# Patient Record
Sex: FEMALE | Race: WHITE | NOT HISPANIC OR LATINO | Employment: FULL TIME | URBAN - METROPOLITAN AREA
[De-identification: names, ages, dates, MRNs, and addresses within clinical notes are randomized per-mention and may not be internally consistent; named-entity substitution may affect disease eponyms.]

---

## 2021-02-05 ENCOUNTER — TELEPHONE (OUTPATIENT)
Dept: PLASTIC SURGERY | Facility: CLINIC | Age: 31
End: 2021-02-05

## 2021-02-08 NOTE — TELEPHONE ENCOUNTER
Called pt regarding appt request. No answer, LVM with [facility] contact information.     Janelle Rhoades

## 2021-02-19 ENCOUNTER — TELEPHONE (OUTPATIENT)
Dept: PLASTIC SURGERY | Facility: CLINIC | Age: 31
End: 2021-02-19

## 2021-02-19 DIAGNOSIS — F64.0 GENDER DYSPHORIA IN ADOLESCENT AND ADULT: Primary | ICD-10-CM

## 2021-02-19 NOTE — TELEPHONE ENCOUNTER
[facility] :  Care Coordination Note     SITUATION   Pt (Max, they/he) is a 30 year old female who is receiving support for:  Care Team  .    BACKGROUND     Pt is interested in top surgery with Dr. Owens. Pt previously had a breast reduction with Dr Coats at [facility] in [place] 15 years ago. Scheduled consultation with Dr. Owens 9/28/21.     Pt does not have insurance at the moment, but will have it soon. Pt to call with insurance information as soon as they get it.     ASSESSMENT     Surgery              [facility] Assessment  [facility] ([facility]) Enrollment: (P) Enrolled  Patient has a therapist: (P) Yes  Name of therapist: (P) Talha Ling at [facility] in [place]  Letter of support #1: (P) Requested  Surgery being considered: (P) Yes  Mastectomy: (P) Yes    Pt reports:    No smoking    No diabetes    Intermittent HRT use for 2 years, currently restarted 1 month ago    Previously underwent breast reduction with Dr Coats at [facility] in [place] 15 years ago.    Sees Talha Ling at [facility] in [place]      PLAN          Nursing Interventions:  [facility] assessment completed    Follow-up plan:    1. Writer to request records if possible    2. Obtain BRANDON Rhoades

## 2021-04-14 NOTE — TELEPHONE ENCOUNTER
FUTURE VISIT INFORMATION      FUTURE VISIT INFORMATION:    Date: 9.28.21    Time: 11 Am    Location: [facility]  REFERRAL INFORMATION:    Referring provider:  NA    Referring providers clinic: NA      Reason for visit/diagnosis  new top    RECORDS REQUESTED FROM:       Clinic name Comments Records Status Imaging Status   [facility] Dr. Gissell Coats,  breast reduction surgery in 2006 In process                                      Action    Action Taken Sent fax to [facility] at fax number [phone]

## 2021-05-01 ENCOUNTER — HEALTH MAINTENANCE LETTER (OUTPATIENT)
Age: 31
End: 2021-05-01

## 2021-07-01 NOTE — TELEPHONE ENCOUNTER
FUTURE VISIT INFORMATION      FUTURE VISIT INFORMATION:    Date: 9/28/21    Time:11:00am    Location:[facility]  REFERRAL INFORMATION:    Referring provider:  self    Referring providers clinic:  N/A    Reason for visit/diagnosis  top consult    RECORDS REQUESTED FROM:       Clinic name Comments Records Status Imaging Status   [facility] Request for recs sent 7/1- resent 8/4- Per clinic unable to locate any records under this name- checking about any prior names- LM for patient x2 checking for prev names

## 2021-09-27 ENCOUNTER — TELEPHONE (OUTPATIENT)
Dept: SURGERY | Facility: CLINIC | Age: 31
End: 2021-09-27

## 2021-09-27 ENCOUNTER — TELEPHONE (OUTPATIENT)
Dept: PLASTIC SURGERY | Facility: CLINIC | Age: 31
End: 2021-09-27

## 2021-09-27 NOTE — TELEPHONE ENCOUNTER
YEISON for patient confirming appointment with  tomorrow 9/28 at 11:00 in person visit.    Call back number was provided.

## 2021-09-27 NOTE — TELEPHONE ENCOUNTER
[facility] Call Center    Phone Message    May a detailed message be left on voicemail: yes     Reason for Call: Other: Max is calling in asking to have their appointment on 9/28 canceled. Patient states that they have a scheduling conflict and will be uunable to attend the appointment, and will call back at a later date to reschedule.     Action Taken: Message routed to:  [facility]    Travel Screening: Not Applicable

## 2021-09-28 ENCOUNTER — PRE VISIT (OUTPATIENT)
Dept: SURGERY | Facility: CLINIC | Age: 31
End: 2021-09-28

## 2021-09-28 ENCOUNTER — PRE VISIT (OUTPATIENT)
Dept: PLASTIC SURGERY | Facility: CLINIC | Age: 31
End: 2021-09-28

## 2021-10-11 ENCOUNTER — HEALTH MAINTENANCE LETTER (OUTPATIENT)
Age: 31
End: 2021-10-11

## 2022-05-22 ENCOUNTER — HEALTH MAINTENANCE LETTER (OUTPATIENT)
Age: 32
End: 2022-05-22

## 2022-09-25 ENCOUNTER — HEALTH MAINTENANCE LETTER (OUTPATIENT)
Age: 32
End: 2022-09-25

## 2023-06-04 ENCOUNTER — HEALTH MAINTENANCE LETTER (OUTPATIENT)
Age: 33
End: 2023-06-04

## 2025-04-28 ENCOUNTER — TELEPHONE (OUTPATIENT)
Dept: BEHAVIORAL HEALTH | Facility: CLINIC | Age: 35
End: 2025-04-28

## 2025-04-28 ENCOUNTER — HOSPITAL ENCOUNTER (INPATIENT)
Facility: CLINIC | Age: 35
DRG: 885 | End: 2025-04-28
Attending: FAMILY MEDICINE | Admitting: PSYCHIATRY & NEUROLOGY
Payer: COMMERCIAL

## 2025-04-28 DIAGNOSIS — F43.10 PTSD (POST-TRAUMATIC STRESS DISORDER): ICD-10-CM

## 2025-04-28 DIAGNOSIS — R45.851 SUICIDAL IDEATION: ICD-10-CM

## 2025-04-28 DIAGNOSIS — F33.1 MAJOR DEPRESSIVE DISORDER, RECURRENT EPISODE, MODERATE (H): ICD-10-CM

## 2025-04-28 DIAGNOSIS — F41.1 GAD (GENERALIZED ANXIETY DISORDER): ICD-10-CM

## 2025-04-28 PROBLEM — F64.0 GENDER DYSPHORIA IN ADULT: Chronic | Status: ACTIVE | Noted: 2025-04-28

## 2025-04-28 LAB
AMPHETAMINES UR QL SCN: ABNORMAL
BARBITURATES UR QL SCN: ABNORMAL
BENZODIAZ UR QL SCN: ABNORMAL
BZE UR QL SCN: ABNORMAL
CANNABINOIDS UR QL SCN: ABNORMAL
FENTANYL UR QL: ABNORMAL
OPIATES UR QL SCN: ABNORMAL
PCP QUAL URINE (ROCHE): ABNORMAL

## 2025-04-28 PROCEDURE — 99285 EMERGENCY DEPT VISIT HI MDM: CPT | Performed by: FAMILY MEDICINE

## 2025-04-28 PROCEDURE — 250N000013 HC RX MED GY IP 250 OP 250 PS 637: Performed by: EMERGENCY MEDICINE

## 2025-04-28 PROCEDURE — 80307 DRUG TEST PRSMV CHEM ANLYZR: CPT | Performed by: FAMILY MEDICINE

## 2025-04-28 PROCEDURE — 250N000013 HC RX MED GY IP 250 OP 250 PS 637: Performed by: FAMILY MEDICINE

## 2025-04-28 RX ORDER — TESTOSTERONE 1.62 MG/G
3 GEL TRANSDERMAL DAILY
Status: ON HOLD | COMMUNITY
End: 2025-05-02

## 2025-04-28 RX ORDER — ONDANSETRON 4 MG/1
4 TABLET, ORALLY DISINTEGRATING ORAL EVERY 8 HOURS PRN
Status: ON HOLD | COMMUNITY
End: 2025-05-02

## 2025-04-28 RX ORDER — ACETAMINOPHEN 325 MG/1
650 TABLET ORAL EVERY 6 HOURS PRN
Status: DISCONTINUED | OUTPATIENT
Start: 2025-04-28 | End: 2025-04-29

## 2025-04-28 RX ORDER — HYDROXYZINE PAMOATE 50 MG/1
50-100 CAPSULE ORAL
Status: ON HOLD | COMMUNITY
End: 2025-05-02

## 2025-04-28 RX ORDER — HYDROXYZINE HYDROCHLORIDE 25 MG/1
25 TABLET, FILM COATED ORAL EVERY 6 HOURS PRN
Status: DISCONTINUED | OUTPATIENT
Start: 2025-04-28 | End: 2025-04-29

## 2025-04-28 RX ORDER — BUSPIRONE HYDROCHLORIDE 10 MG/1
20 TABLET ORAL 2 TIMES DAILY
COMMUNITY

## 2025-04-28 RX ORDER — HYDROXYZINE HYDROCHLORIDE 50 MG/1
50 TABLET, FILM COATED ORAL EVERY 6 HOURS PRN
Status: DISCONTINUED | OUTPATIENT
Start: 2025-04-28 | End: 2025-04-29

## 2025-04-28 RX ORDER — SUMATRIPTAN 50 MG/1
50 TABLET, FILM COATED ORAL
Status: ON HOLD | COMMUNITY
End: 2025-05-02

## 2025-04-28 RX ORDER — ACETAMINOPHEN 500 MG
500 TABLET ORAL ONCE
Status: COMPLETED | OUTPATIENT
Start: 2025-04-28 | End: 2025-04-28

## 2025-04-28 RX ORDER — OXYCODONE AND ACETAMINOPHEN 5; 325 MG/1; MG/1
1 TABLET ORAL ONCE
Status: COMPLETED | OUTPATIENT
Start: 2025-04-28 | End: 2025-04-28

## 2025-04-28 RX ORDER — HYDROXYZINE HYDROCHLORIDE 25 MG/1
25 TABLET, FILM COATED ORAL ONCE
Status: COMPLETED | OUTPATIENT
Start: 2025-04-28 | End: 2025-04-28

## 2025-04-28 RX ORDER — MAGNESIUM HYDROXIDE/ALUMINUM HYDROXICE/SIMETHICONE 120; 1200; 1200 MG/30ML; MG/30ML; MG/30ML
30 SUSPENSION ORAL EVERY 6 HOURS PRN
Status: DISCONTINUED | OUTPATIENT
Start: 2025-04-28 | End: 2025-04-29

## 2025-04-28 RX ORDER — DULOXETIN HYDROCHLORIDE 60 MG/1
60 CAPSULE, DELAYED RELEASE ORAL DAILY
COMMUNITY

## 2025-04-28 RX ADMIN — ACETAMINOPHEN 500 MG: 500 TABLET ORAL at 15:11

## 2025-04-28 RX ADMIN — ALUMINUM HYDROXIDE, MAGNESIUM HYDROXIDE, AND SIMETHICONE 30 ML: 200; 200; 20 SUSPENSION ORAL at 21:18

## 2025-04-28 RX ADMIN — HYDROXYZINE HYDROCHLORIDE 25 MG: 25 TABLET, FILM COATED ORAL at 15:10

## 2025-04-28 RX ADMIN — ACETAMINOPHEN 650 MG: 325 TABLET ORAL at 20:50

## 2025-04-28 ASSESSMENT — ACTIVITIES OF DAILY LIVING (ADL)
ADLS_ACUITY_SCORE: 41

## 2025-04-28 ASSESSMENT — COLUMBIA-SUICIDE SEVERITY RATING SCALE - C-SSRS
1. IN THE PAST MONTH, HAVE YOU WISHED YOU WERE DEAD OR WISHED YOU COULD GO TO SLEEP AND NOT WAKE UP?: YES
3. HAVE YOU BEEN THINKING ABOUT HOW YOU MIGHT KILL YOURSELF?: YES
2. HAVE YOU ACTUALLY HAD ANY THOUGHTS OF KILLING YOURSELF IN THE PAST MONTH?: YES
6. HAVE YOU EVER DONE ANYTHING, STARTED TO DO ANYTHING, OR PREPARED TO DO ANYTHING TO END YOUR LIFE?: YES
5. HAVE YOU STARTED TO WORK OUT OR WORKED OUT THE DETAILS OF HOW TO KILL YOURSELF? DO YOU INTEND TO CARRY OUT THIS PLAN?: NO
4. HAVE YOU HAD THESE THOUGHTS AND HAD SOME INTENTION OF ACTING ON THEM?: YES
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
## 2025-04-28 NOTE — ED PROVIDER NOTES
"    [facility] EMERGENCY DEPARTMENT ([facility])    4/28/25      ED PROVIDER NOTE   History     Chief Complaint   Patient presents with    Suicidal     The history is provided by the patient and medical records.     Jason Alfaro is a 34 year old female with a history of EVELYNE, MDD, and PTSD who presents to the ED for Mental Health Evaluation.  Patient notes that they have been dealing with issues for several years.  Has never been admitted for mental health issues.  Patient now has had some increasing stressors etc. with increasing anxiety typically takes hydroxyzine for this.  Patient notes increasing COVID suicidal ideations is thought about taking the bedsheet and strangling herself here in the ER also.  Denies any major drug use and just recently stopped her marijuana use has been ongoing for years but feels is not a precipitating agent.  Denies hallucinations otherwise.  No other ingestions reported this point now presents here for evaluation    Past Medical History  No past medical history on file.  No past surgical history on file.  No current outpatient medications on file.    No Known Allergies  Family History  No family history on file.  Social History   Social History     Tobacco Use    Smoking status: Former     Types: Cigarettes    Smokeless tobacco: Never   Substance Use Topics    Alcohol use: Not Currently      A medically appropriate review of systems was performed with pertinent positives and negatives noted in the HPI, and all other systems negative.    Physical Exam   BP: 103/72  Pulse: 97  Temp: 98.3  F (36.8  C)  Resp: 18  Height: 160 cm (5' 3\")  Weight: 61.2 kg (135 lb)  SpO2: 98 %  Physical Exam  Vitals and nursing note reviewed.   Constitutional:       General: She is in acute distress.      Appearance: Normal appearance. She is well-developed. She is not toxic-appearing.      Comments: Patient is interactive and cooperative with good eye contact.  Not combative does not appear to be " impaired .  Does admit to having the suicidal thoughts.   HENT:      Head: Normocephalic and atraumatic.      Mouth/Throat:      Mouth: Mucous membranes are moist.      Pharynx: Oropharynx is clear.   Eyes:      General: No scleral icterus.     Extraocular Movements: Extraocular movements intact.      Conjunctiva/sclera: Conjunctivae normal.      Pupils: Pupils are equal, round, and reactive to light.   Neck:      Comments: Some mild neck pain without meningismus  Cardiovascular:      Rate and Rhythm: Normal rate and regular rhythm.   Pulmonary:      Effort: Pulmonary effort is normal. No respiratory distress.      Breath sounds: No stridor.   Abdominal:      General: Abdomen is flat.      Tenderness: There is no guarding.   Musculoskeletal:         General: No signs of injury.      Cervical back: Normal range of motion and neck supple. Tenderness present. No rigidity.   Skin:     General: Skin is warm and dry.      Capillary Refill: Capillary refill takes less than 2 seconds.      Coloration: Skin is not jaundiced or pale.      Findings: No rash.   Neurological:      General: No focal deficit present.      Mental Status: She is alert and oriented to person, place, and time. Mental status is at baseline.   Psychiatric:      Comments: Mildly flattened affect with suicidal ideations at this point.  Does have some impulsive thoughts also.  He is not delusional not homicidal etc.  Cooperative here in the ER           ED Course, Procedures, & Data      Records reviewed in epic as noted.  Patient history of some cyclical vomiting in the past  Meds reviewed allergies reviewed.  Patient history of a stress disorder PTSD general anxiety disorder.    ER did order drug screen along with this will order a DEC assessment also.  Screen positive for THC primarily.    DEC evaluation recommended admission patient is voluntary does agree at this point.  I did write for some orders planning for admission to mental health.  The patient  wants to leave would reassess at this point.  Otherwise been cooperative did receive Tylenol for some chronic neck pain and back pain.              Procedures                Results for orders placed or performed during the hospital encounter of 04/28/25   Urine Drug Screen Panel     Status: Abnormal   Result Value Ref Range    Amphetamines Urine Screen Negative Screen Negative    Barbituates Urine Screen Negative Screen Negative    Benzodiazepine Urine Screen Negative Screen Negative    Cannabinoids Urine Screen Positive (A) Screen Negative    Cocaine Urine Screen Negative Screen Negative    Fentanyl Qual Urine Screen Negative Screen Negative    Opiates Urine Screen Negative Screen Negative    PCP Urine Screen Negative Screen Negative   Urine Drug Screen     Status: Abnormal    Narrative    The following orders were created for panel order Urine Drug Screen.  Procedure                               Abnormality         Status                     ---------                               -----------         ------                     Urine Drug Screen Panel[[id number]]     Abnormal            Final result                 Please view results for these tests on the individual orders.     Medications   hydrOXYzine HCl (ATARAX) tablet 25 mg (has no administration in time range)     Or   hydrOXYzine HCl (ATARAX) tablet 50 mg (has no administration in time range)   hydrOXYzine HCl (ATARAX) tablet 25 mg (25 mg Oral $Given 4/28/25 1510)   acetaminophen (TYLENOL) tablet 500 mg (500 mg Oral $Given 4/28/25 1511)   oxyCODONE-acetaminophen (PERCOCET) 5-325 MG per tablet 1 tablet (1 tablet Oral Not Given 4/28/25 1654)     Labs Ordered and Resulted from Time of ED Arrival to Time of ED Departure   URINE DRUG SCREEN PANEL - Abnormal       Result Value    Amphetamines Urine Screen Negative      Barbituates Urine Screen Negative      Benzodiazepine Urine Screen Negative      Cannabinoids Urine Screen Positive (*)     Cocaine Urine  Screen Negative      Fentanyl Qual Urine Screen Negative      Opiates Urine Screen Negative      PCP Urine Screen Negative       No orders to display          Critical care was not performed.     Medical Decision Making  The patient's presentation was of high complexity (an acute health issue posing potential threat to life or bodily function).    The patient's evaluation involved:  review of external note(s) from 3+ sources (see separate area of note for details)  review of 3+ test result(s) ordered prior to this encounter (see separate area of note for details)  ordering and/or review of 3+ test(s) in this encounter (see separate area of note for details)  discussion of management or test interpretation with another health professional (see separate area of note for details)    The patient's management necessitated high risk (a decision regarding hospitalization).    Assessment & Plan   34-year-old patient with history of general anxiety disorder along with that major depressive disorder PTSD presents now with increasing thoughts suicidal ideations impulsive activity etc.  History of self injures behavior but not currently.  Patient is thoughts of taking a sheet and strangle himself at times.  This point of cooperative patient typically takes hydroxyzine for symptoms.  Here in the ER did receive 25 mg hydroxyzine drug screen just shows positive THC which patient states they have given up as of last weekend.  Felt this is not a precipitating event now presents here for seeking help will be seen by DEC assessment.  DEC evaluation recommends admission mental health patient voluntary agrees with plan.  Patient given Tylenol for some reported neck and lower back pain.  Otherwise patient currently is resting here voluntary will admit to mental health for ongoing evaluation suicidal risk if patient wants to leave needs to be reassessed.  Signed out to evening physician       I have reviewed the nursing notes. I have  reviewed the findings, diagnosis, plan and need for follow up with the patient.    New Prescriptions    No medications on file       Final diagnoses:   Suicidal ideation   PTSD (post-traumatic stress disorder)   Major depressive disorder, recurrent episode, moderate (H)   EVELYNE (generalized anxiety disorder)       Chester Paulino MD  [facility] EMERGENCY DEPARTMENT  4/28/2025    This note was created at least in part by the use of dragon voice dictation system. Inadvertent typographical errors may still exist.  Chester Paulino MD.  Patient evaluated in the emergency department during the COVID-19 pandemic period. Careful attention to patients safety was addressed throughout the evaluation. Evaluation and treatment management was initiated with disposition made efficiently and appropriate as possible to minimize any risk of potential exposure to patient during this evaluation.       Chester Paulino MD  04/28/25 1943

## 2025-04-28 NOTE — PROGRESS NOTES
IP MH Referral Acuity Rating Score (RARS)    LMHP complete at referral to IP MH, with DEC; and, daily while awaiting IP MH placement. Call score to PPS.  CRITERIA SCORING   New 71 HH and Involuntary for IP MH (not adolescent) 0/3   Boarding over 24 hours 0/1   Vulnerable adult at least 55+ with multiple co morbidities; or, Patient age 11 or under 0/1   Suicide ideation without relief of precipitating factors 1/1   Current plan for suicide 1/1   Current plan for homicide 0/1   Imminent risk or actual attempt to seriously harm another without relief of factors precipitating the attempt 0/1   Severe dysfunction in daily living (ex: complete neglect for self care, extreme disruption in vegetative function, extreme deterioration in social interactions) 1/1   Recent (last 2 weeks) or current physical aggression in the ED 0/1   Restraints or seclusion episode in ED 0/1   Verbal aggression, agitation, yelling, etc., while in the ED 0/1   Active psychosis with psychomotor agitation or catatonia 0/1   Need for constant or near constant redirection (from leaving, from others, etc).  0/1   Intrusive or disruptive behaviors 1/1   TOTAL 4       LIZZY Santiago, LGJANUARY  Psychotherapist Trainee, DEC   [facility]  [email or web address]
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
## 2025-04-28 NOTE — ED TRIAGE NOTES
Triage Assessment (Adult)       Row Name 04/28/25 1343          Triage Assessment    Airway WDL WDL        Respiratory WDL    Respiratory WDL WDL        Skin Circulation/Temperature WDL    Skin Circulation/Temperature WDL WDL        Cardiac WDL    Cardiac WDL WDL        Cognitive/Neuro/Behavioral WDL    Cognitive/Neuro/Behavioral WDL WDL

## 2025-04-29 PROBLEM — R45.851 SUICIDAL IDEATION: Status: ACTIVE | Noted: 2025-04-29

## 2025-04-29 PROBLEM — F33.1 MAJOR DEPRESSIVE DISORDER, RECURRENT EPISODE, MODERATE (H): Status: ACTIVE | Noted: 2025-04-29

## 2025-04-29 LAB
ANION GAP SERPL CALCULATED.3IONS-SCNC: 13 MMOL/L (ref 7–15)
BUN SERPL-MCNC: 9.9 MG/DL (ref 6–20)
CALCIUM SERPL-MCNC: 9 MG/DL (ref 8.8–10.4)
CHLORIDE SERPL-SCNC: 106 MMOL/L (ref 98–107)
CHOLEST SERPL-MCNC: 149 MG/DL
CREAT SERPL-MCNC: 0.86 MG/DL (ref 0.51–0.95)
EGFRCR SERPLBLD CKD-EPI 2021: 90 ML/MIN/1.73M2
ERYTHROCYTE [DISTWIDTH] IN BLOOD BY AUTOMATED COUNT: 12.2 % (ref 10–15)
EST. AVERAGE GLUCOSE BLD GHB EST-MCNC: 91 MG/DL
GLUCOSE SERPL-MCNC: 104 MG/DL (ref 70–99)
HBA1C MFR BLD: 4.8 %
HCO3 SERPL-SCNC: 26 MMOL/L (ref 22–29)
HCT VFR BLD AUTO: 41.6 % (ref 35–47)
HDLC SERPL-MCNC: 45 MG/DL
HGB BLD-MCNC: 14.3 G/DL (ref 11.7–15.7)
LDLC SERPL CALC-MCNC: 95 MG/DL
MCH RBC QN AUTO: 31 PG (ref 26.5–33)
MCHC RBC AUTO-ENTMCNC: 34.4 G/DL (ref 31.5–36.5)
MCV RBC AUTO: 90 FL (ref 78–100)
NONHDLC SERPL-MCNC: 104 MG/DL
PLATELET # BLD AUTO: 248 10E3/UL (ref 150–450)
POTASSIUM SERPL-SCNC: 4.1 MMOL/L (ref 3.4–5.3)
RBC # BLD AUTO: 4.61 10E6/UL (ref 3.8–5.2)
SODIUM SERPL-SCNC: 145 MMOL/L (ref 135–145)
TRIGL SERPL-MCNC: 46 MG/DL
WBC # BLD AUTO: 5.5 10E3/UL (ref 4–11)

## 2025-04-29 PROCEDURE — 250N000013 HC RX MED GY IP 250 OP 250 PS 637: Performed by: STUDENT IN AN ORGANIZED HEALTH CARE EDUCATION/TRAINING PROGRAM

## 2025-04-29 PROCEDURE — 36415 COLL VENOUS BLD VENIPUNCTURE: CPT | Performed by: STUDENT IN AN ORGANIZED HEALTH CARE EDUCATION/TRAINING PROGRAM

## 2025-04-29 PROCEDURE — 82306 VITAMIN D 25 HYDROXY: CPT

## 2025-04-29 PROCEDURE — 99222 1ST HOSP IP/OBS MODERATE 55: CPT | Mod: AI

## 2025-04-29 PROCEDURE — 124N000002 HC R&B MH UMMC

## 2025-04-29 PROCEDURE — 250N000011 HC RX IP 250 OP 636

## 2025-04-29 PROCEDURE — 85014 HEMATOCRIT: CPT | Performed by: PSYCHIATRY & NEUROLOGY

## 2025-04-29 PROCEDURE — 97150 GROUP THERAPEUTIC PROCEDURES: CPT | Mod: GO

## 2025-04-29 PROCEDURE — 83036 HEMOGLOBIN GLYCOSYLATED A1C: CPT | Performed by: STUDENT IN AN ORGANIZED HEALTH CARE EDUCATION/TRAINING PROGRAM

## 2025-04-29 PROCEDURE — 250N000013 HC RX MED GY IP 250 OP 250 PS 637: Performed by: EMERGENCY MEDICINE

## 2025-04-29 PROCEDURE — 250N000013 HC RX MED GY IP 250 OP 250 PS 637

## 2025-04-29 PROCEDURE — 82465 ASSAY BLD/SERUM CHOLESTEROL: CPT | Performed by: STUDENT IN AN ORGANIZED HEALTH CARE EDUCATION/TRAINING PROGRAM

## 2025-04-29 PROCEDURE — 82607 VITAMIN B-12: CPT

## 2025-04-29 PROCEDURE — 80048 BASIC METABOLIC PNL TOTAL CA: CPT | Performed by: PSYCHIATRY & NEUROLOGY

## 2025-04-29 RX ORDER — HYDROXYZINE HYDROCHLORIDE 25 MG/1
25-50 TABLET, FILM COATED ORAL EVERY 4 HOURS PRN
Status: DISCONTINUED | OUTPATIENT
Start: 2025-04-29 | End: 2025-05-02 | Stop reason: HOSPADM

## 2025-04-29 RX ORDER — ACETAMINOPHEN 325 MG/1
650 TABLET ORAL EVERY 4 HOURS PRN
Status: DISCONTINUED | OUTPATIENT
Start: 2025-04-29 | End: 2025-05-02 | Stop reason: HOSPADM

## 2025-04-29 RX ORDER — TRAZODONE HYDROCHLORIDE 50 MG/1
50 TABLET ORAL
Status: DISCONTINUED | OUTPATIENT
Start: 2025-04-29 | End: 2025-05-02 | Stop reason: HOSPADM

## 2025-04-29 RX ORDER — BUSPIRONE HYDROCHLORIDE 10 MG/1
20 TABLET ORAL 2 TIMES DAILY
Status: DISCONTINUED | OUTPATIENT
Start: 2025-04-29 | End: 2025-05-01

## 2025-04-29 RX ORDER — OLANZAPINE 10 MG/2ML
10 INJECTION, POWDER, FOR SOLUTION INTRAMUSCULAR 3 TIMES DAILY PRN
Status: DISCONTINUED | OUTPATIENT
Start: 2025-04-29 | End: 2025-05-02 | Stop reason: HOSPADM

## 2025-04-29 RX ORDER — HYDROXYZINE HYDROCHLORIDE 25 MG/1
25-50 TABLET, FILM COATED ORAL EVERY 4 HOURS PRN
Status: DISCONTINUED | OUTPATIENT
Start: 2025-04-29 | End: 2025-04-29

## 2025-04-29 RX ORDER — PROPRANOLOL HYDROCHLORIDE 10 MG/1
10 TABLET ORAL 2 TIMES DAILY PRN
Status: DISCONTINUED | OUTPATIENT
Start: 2025-04-29 | End: 2025-04-30

## 2025-04-29 RX ORDER — OLANZAPINE 10 MG/1
10 TABLET, FILM COATED ORAL 3 TIMES DAILY PRN
Status: DISCONTINUED | OUTPATIENT
Start: 2025-04-29 | End: 2025-05-02 | Stop reason: HOSPADM

## 2025-04-29 RX ORDER — ONDANSETRON 4 MG/1
4 TABLET, ORALLY DISINTEGRATING ORAL EVERY 8 HOURS PRN
Status: DISCONTINUED | OUTPATIENT
Start: 2025-04-29 | End: 2025-05-02 | Stop reason: HOSPADM

## 2025-04-29 RX ORDER — MAGNESIUM HYDROXIDE/ALUMINUM HYDROXICE/SIMETHICONE 120; 1200; 1200 MG/30ML; MG/30ML; MG/30ML
30 SUSPENSION ORAL EVERY 4 HOURS PRN
Status: DISCONTINUED | OUTPATIENT
Start: 2025-04-29 | End: 2025-05-02 | Stop reason: HOSPADM

## 2025-04-29 RX ORDER — AMOXICILLIN 250 MG
1 CAPSULE ORAL 2 TIMES DAILY PRN
Status: DISCONTINUED | OUTPATIENT
Start: 2025-04-29 | End: 2025-05-02 | Stop reason: HOSPADM

## 2025-04-29 RX ORDER — HYDROXYZINE HYDROCHLORIDE 50 MG/1
50 TABLET, FILM COATED ORAL
Status: DISCONTINUED | OUTPATIENT
Start: 2025-04-29 | End: 2025-04-29

## 2025-04-29 RX ORDER — DULOXETIN HYDROCHLORIDE 60 MG/1
60 CAPSULE, DELAYED RELEASE ORAL DAILY
Status: DISCONTINUED | OUTPATIENT
Start: 2025-04-29 | End: 2025-05-02 | Stop reason: HOSPADM

## 2025-04-29 RX ORDER — SUMATRIPTAN 50 MG/1
50 TABLET, FILM COATED ORAL
Status: DISCONTINUED | OUTPATIENT
Start: 2025-04-29 | End: 2025-05-02 | Stop reason: HOSPADM

## 2025-04-29 RX ORDER — TESTOSTERONE 20.25 MG/1.25G
60.75 GEL TOPICAL DAILY
Status: DISCONTINUED | OUTPATIENT
Start: 2025-04-29 | End: 2025-05-02 | Stop reason: HOSPADM

## 2025-04-29 RX ORDER — HYDROXYZINE HYDROCHLORIDE 25 MG/1
25 TABLET, FILM COATED ORAL EVERY 4 HOURS PRN
Status: DISCONTINUED | OUTPATIENT
Start: 2025-04-29 | End: 2025-04-29

## 2025-04-29 RX ADMIN — BUSPIRONE HYDROCHLORIDE 20 MG: 10 TABLET ORAL at 08:51

## 2025-04-29 RX ADMIN — ACETAMINOPHEN 650 MG: 325 TABLET ORAL at 16:37

## 2025-04-29 RX ADMIN — HYDROXYZINE HYDROCHLORIDE 50 MG: 25 TABLET, FILM COATED ORAL at 22:22

## 2025-04-29 RX ADMIN — TESTOSTERONE 60.75 MG: 20.25 GEL TOPICAL at 16:34

## 2025-04-29 RX ADMIN — HYDROXYZINE HYDROCHLORIDE 50 MG: 50 TABLET, FILM COATED ORAL at 02:01

## 2025-04-29 RX ADMIN — BUSPIRONE HYDROCHLORIDE 20 MG: 10 TABLET ORAL at 20:13

## 2025-04-29 RX ADMIN — DULOXETINE 60 MG: 60 CAPSULE, DELAYED RELEASE ORAL at 08:51

## 2025-04-29 RX ADMIN — ONDANSETRON 4 MG: 4 TABLET, ORALLY DISINTEGRATING ORAL at 16:38

## 2025-04-29 RX ADMIN — PROPRANOLOL HYDROCHLORIDE 10 MG: 10 TABLET ORAL at 16:37

## 2025-04-29 ASSESSMENT — ACTIVITIES OF DAILY LIVING (ADL)
ADLS_ACUITY_SCORE: 44
LAUNDRY: UNABLE TO COMPLETE
HYGIENE/GROOMING: INDEPENDENT
ADLS_ACUITY_SCORE: 44
ADLS_ACUITY_SCORE: 47
ADLS_ACUITY_SCORE: 44
DRESS: INDEPENDENT
ADLS_ACUITY_SCORE: 44
ADLS_ACUITY_SCORE: 44
ORAL_HYGIENE: INDEPENDENT
ADLS_ACUITY_SCORE: 41
ADLS_ACUITY_SCORE: 44

## 2025-04-29 ASSESSMENT — LIFESTYLE VARIABLES: SKIP TO QUESTIONS 9-10: 1
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
## 2025-04-29 NOTE — PLAN OF CARE
Goal Outcome Evaluation:       Pt was admitted to station 10 from ED at 0115. Down to gown was completed. Pt was admitted for SI and EVELYNE. Upon arrival to unit patient presents as cooperative. Affect is normal and mood is calm. Thought process presents as normal. Speech is clear and rate is normal. Pt rates anxiety at 7/10 and depression at 5/10. Pt denies any SI/HI/SIB and contracts for safety. Patient denies any visual/ auditory hallucinations. Patient was admitted on a voluntary status. Patient was placed on PMA 1:1 precautions. Care plan and education was initiated.

## 2025-04-29 NOTE — PROGRESS NOTES
Chart reviewed for possible station 10 admission.  Writer talked with intake and stated patient needs to be on an SIO 1:1 due to suicidal ideations.  Also, discussed with intake what the testosterone treatment will be and patient ideas of covid suicidal ideation, are they having any covid symptoms or not.  Also, informed intake need order for safety bedding as notes indicates thoughts of wanting to strangle themselves with sheets.  Writer talked with ANS, who will look for safety bedding.  Safety bedding applied by staff near the end of the shift, ANS informed of these findings.

## 2025-04-29 NOTE — PHARMACY-ADMISSION MEDICATION HISTORY
Admission Medication History Completed by Pharmacy    See [facility] Admission Navigator for allergy information, preferred outpatient pharmacy, prior to admission medications and immunization status.     Medication history sources:  Patient; Surescripts     Pertinent changes made to PTA medication list:  Added: All PTA medications added to list (confirmed w/ pt and fill hx)   Deleted: N/A  Changed: N/A    Additional medication history information:   - Patient denies taking any additional Rx/OTC medications other than the ones listed below.    Prior to Admission medications    Medication Sig Last Dose Taking? Auth Provider Long Term End Date   busPIRone (BUSPAR) 10 MG tablet Take 20 mg by mouth 2 times daily. 4/28/2025 Morning Yes Unknown, Entered By History Yes    DULoxetine (CYMBALTA) 60 MG capsule Take 60 mg by mouth daily. 4/28/2025 Yes Unknown, Entered By History Yes    hydrOXYzine (VISTARIL) 50 MG capsule Take  mg by mouth nightly as needed (sleep). Past Week Yes Unknown, Entered By History     ondansetron (ZOFRAN ODT) 4 MG ODT tab Take 4 mg by mouth every 8 hours as needed for nausea. Past Month Yes Unknown, Entered By History     SUMAtriptan (IMITREX) 50 MG tablet Take 50 mg by mouth at onset of headache for migraine. Past Month Yes Unknown, Entered By History     testosterone (ANDROGEL 1.62 % PUMP) 20.25 MG/ACT gel Place 3 Pump onto the skin daily. 4/28/2025 Yes Unknown, Entered By History Yes           Date completed: 04/28/25    Medication history completed by:   June Rosales, PharmD  *[id number]

## 2025-04-29 NOTE — PSYCH
"Writer received call from Seferino regarding unit's request for a \"safety bed sheet\" order. Patient reported to already have been admitted. Requested order placed.   "
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
## 2025-04-29 NOTE — PLAN OF CARE
Called and spoke to pt regarding Lt Hip Pain she states she was seen in Urgent Care but will like to get X- Ray. Pt is schedule on 1/25/24 with Gwen Ibrahim.   Goal Outcome Evaluation:       Problem: Sleep Disturbance  Goal: Adequate Sleep/Rest  Outcome: Adequate for Care Transition         Pt appeared asleep for 4.5 hours. PRN hydroxyzine taken as ordered for sleep with effective. No acute behavioral and medical concerns noted and reported. Remains PMA 1:1. Will continue POC.

## 2025-04-29 NOTE — PROGRESS NOTES
Rehab Group     Start time: 1015  End time: 1145  Patient time total: 80 minutes     attended partial group     #6 attended   Group Type: occupational therapy and OT Clinic   Group Topic Covered: activity therapy, coping skills, and problem solving         Group Session Detail:  OT Clinic      Patient Response/Contribution:  Cooperative with task, organized, supportive of peers, socially appropriate, attentive, and actively engaged         Patient Detail:     Pt actively participated in occupational therapy clinic to facilitate coping skill exploration, creative expression within personally meaningful activities, and clinical observation of social, cognitive, and kinesthetic performance skills. Pt response: Independent to initiate, gather materials, sequence, and adjust to workspace demands as needed. Demonstrated good focus, planning, and problem solving for selected sticker by number task. Able to ask for assistance as needed, and socially appropriate with peers and staff. Pt will continue to be encouraged to attend groups for further asssesssment and to address goals identified on plan of care.         [id number] OT Group (2 or more in attendance)  Patient Active Problem List   Diagnosis    EVELYNE (generalized anxiety disorder)    Moderate episode of recurrent major depressive disorder (H)    PTSD (post-traumatic stress disorder)    Gender dysphoria in adult    Major depressive disorder, recurrent episode, moderate (H)    Suicidal ideation

## 2025-04-29 NOTE — PLAN OF CARE
-Attending Provider: MARIA ESTHER Dumont CNP  -Voicemail Code: [id number]  -Team Note Due: Tuesday  -Next Steps:    Follow up with [facility] regarding insurance covering IOP  If not, PHP is covered         Assessment/Intervention/Current Symtoms and Care Coordination:  Chart review and met with team, discussed pt progress, symptomology, and response to treatment.  Discussed the discharge plan and any potential impediments to discharge.    [facility] met with [place] to introduce self, explain role of CTC, and assess for resources. [facility] obtained signature on [facility] for [facility]. Max identified completing a PHP program with them prior, and would be interested in going back to that agency for an IOP. [facility] contracted plan to call start working on referral. Max confirmed the list of providers identified in the ED. [place] gave verbal permission to coordinate with psych provider.     [facility] called [facility] to inquire about IOP options. [facility] was informed that the IOP usually does not accept Max's insurance but the PHP program does. [facility] was transferred to the financial department to inquire if Jason's specific insurance plan would cover IOP. Transfer was not answered by a person and CTC left a voicemail explaining CTC's question.      Discharge Plan or Goal:  Pending further stabilization, continued compliance with medications, continued activities of daily living, and development of a safe discharge plan.   Considerations:     Barriers to Discharge:  Impact of mental health symptoms on well being   Impact of mental health symptoms on activities of daily living  Need for medication monitoring and medication management     Referral Status:  None at this time     Legal Status:  Voluntary   County:   File Number:   Start and expiration date of commitment:     Contacts:    Tammi Gautam PA-C as PCP - General (Family Medicine)  Rd Borrero as Specialty Care Coordinator (Plastic Surgery)  Marcelle Owens MD as MD (Plastic  Surgery)  Mari - Therapist at [facility]  Dr. Weldon - Psychiatrist @ [facility]  [facility]

## 2025-04-29 NOTE — PLAN OF CARE
Shoes (black)  Pajama pants  Black T-shirt  1 pair of socks  Black sweatshirt  5 pack boxers  Wallet and several bank cards  Black iphone   block  Headphones   Black comb      A               Admission:  I am responsible for any personal items that are not sent to the safe or pharmacy.  [facility] is not responsible for loss, theft or damage of any property in my possession.    Signature:  _________________________________ Date: _______  Time: _____                                              Staff Signature:  ____________________________ Date: ________  Time: _____      2nd Staff person, if patient is unable/unwilling to sign:    Signature: ________________________________ Date: ________  Time: _____     Discharge:  [facility] has returned all of my personal belongings:    Signature: _________________________________ Date: ________  Time: _____                                          Staff Signature:  ____________________________ Date: ________  Time: _____
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
## 2025-04-29 NOTE — PLAN OF CARE
Shift Summary ([id number])  Mental Health Status   Pt is alert and oriented x 4. Able to communicate needs. New admission from last night. No acute events or safety concern this shift. Eye contact is appropriate. Mood is depressed with flat affect. No delusion, hallucination, manic or psychotic symptoms noted. Appears to be in touch with reality. Insight and judgement are poor.   Pt denies SIB, AH, VH, anxiety, depression, racing or intrusive thoughts. Reported having chronic suicidal thought. Feels safe in unit and contracts for safety.   Pt was visible in lounge appropriately social with staff and peers. Participated in therapeutic group activity.    Pt took scheduled medication ok with no problem. Tolerated medications well. No side effect reported or observed.  Prn given: None   Physical Health Status   Moves around independently with no difficulties.   Hygiene is appropriate.   Appetite and fluid intake are adequate. Ate both breakfast and lunch.   Reported no problem with bowel and bladder.   Slept 4.5 hours last night per staff's report.   Reported chronic lower neck pain. Pt believes it is genetic. Declined any intervention including prn medication.   Today's Lab orders: CBC with platelet, BMP, Lipid. Hemoglobin A1C. Pt was cooperative with blood draw. Results are with in normal range.   Sitting /71 & pulse 76  Standing BP 98/68 & pulse 78  Prn given: None   Continue to monitor pt's status Q 15 minutes and stabilize the patient's symptoms with the use of medications and therapeutic programming.

## 2025-04-29 NOTE — PSYCH
Writer received call from unit nurse regarding admission orders for patient admitted for depression/SI. Patient reported to be medically cleared. Admission orders placed and applicable labs ordered.

## 2025-04-29 NOTE — PLAN OF CARE
BEH IP Unit Acuity Rating Score (UARS)  Patient is given one point for every criteria they meet.    CRITERIA SCORING   On a 72 hour hold, court hold, committed, stay of commitment, or revocation. 0    Patient LOS on BEH unit exceeds 20 days. 0  LOS: 0   Patient under guardianship, 55+, otherwise medically complex, or under age 11. 0   Suicide ideation without relief of precipitating factors. 1   Current plan for suicide. 1   Current plan for homicide. 0   Imminent risk or actual attempt to seriously harm another without relief of factors precipitating the attempt. 0   Severe dysfunction in daily living (ex: complete neglect for self care, extreme disruption in vegetative function, extreme deterioration in social interactions). 1   Recent (last 7 days) or current physical aggression in the ED or on unit. 0   Restraints or seclusion episode in past 72 hours. 0   Recent (last 7 days) or current verbal aggression, agitation, yelling, etc., while in the ED or unit. 0   Active psychosis. 0   Need for constant or near constant redirection (from leaving, from others, etc).  0   Intrusive or disruptive behaviors. 1   Patient requires 3 or more hours of individualized nursing care per 8-hour shift (i.e. for ADLs, meds, therapeutic interventions). 0   TOTAL 4

## 2025-04-29 NOTE — CARE PLAN
04/29/25 0957   Adult Attendant Initiation Algorithm   Harm Category suicide/self-harm   Suicide/Self-Harm high risk for suicide (provider)   Indicated by provider order   No Harm Category Noted at This Time no applicable harm categories or justifications   Types of Special Monitoring continuous

## 2025-04-29 NOTE — PLAN OF CARE
04/29/25 0808   Patient Belongings   Disposition of meds  Sent to security/pharmacy per site process   Patient Belongings locker;sent to security per site process   Patient Belongings Put in Hospital Secure Location (Security or Locker, etc.) cash/credit card;clothing;cell phone/electronics   Belongings Search Yes   Clothing Search Yes   Second Staff Ettiene and Kevin     Locker: Shoes (black), Pajama pants, Black T-shirt, 1 pair of socks, Black sweatshirt, 5 pack,, boxers, Wallet w/social security card and library card, Black iphone,  block, Headphones, Black comb    Security: [employer] Security plus [phone], EBT [phone], thePlatformtop [phone]    A               Admission:  I am responsible for any personal items that are not sent to the safe or pharmacy.  [facility] is not responsible for loss, theft or damage of any property in my possession.    Signature:  _________________________________ Date: _______  Time: _____                                              Staff Signature:  ____________________________ Date: ________  Time: _____      2nd Staff person, if patient is unable/unwilling to sign:    Signature: ________________________________ Date: ________  Time: _____     Discharge:  [facility] has returned all of my personal belongings:    Signature: _________________________________ Date: ________  Time: _____                                          Staff Signature:  ____________________________ Date: ________  Time: _____
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
## 2025-04-30 LAB
VIT B12 SERPL-MCNC: 350 PG/ML (ref 232–1245)
VIT D+METAB SERPL-MCNC: 35 NG/ML (ref 20–50)

## 2025-04-30 PROCEDURE — H2032 ACTIVITY THERAPY, PER 15 MIN: HCPCS

## 2025-04-30 PROCEDURE — 99232 SBSQ HOSP IP/OBS MODERATE 35: CPT

## 2025-04-30 PROCEDURE — 124N000002 HC R&B MH UMMC

## 2025-04-30 PROCEDURE — 250N000013 HC RX MED GY IP 250 OP 250 PS 637

## 2025-04-30 PROCEDURE — 250N000013 HC RX MED GY IP 250 OP 250 PS 637: Performed by: STUDENT IN AN ORGANIZED HEALTH CARE EDUCATION/TRAINING PROGRAM

## 2025-04-30 PROCEDURE — 90837 PSYTX W PT 60 MINUTES: CPT

## 2025-04-30 PROCEDURE — 90853 GROUP PSYCHOTHERAPY: CPT

## 2025-04-30 RX ORDER — PANTOPRAZOLE SODIUM 40 MG/1
40 TABLET, DELAYED RELEASE ORAL
Status: DISCONTINUED | OUTPATIENT
Start: 2025-04-30 | End: 2025-05-02 | Stop reason: HOSPADM

## 2025-04-30 RX ORDER — PROPRANOLOL HYDROCHLORIDE 10 MG/1
10 TABLET ORAL 2 TIMES DAILY
Status: DISCONTINUED | OUTPATIENT
Start: 2025-04-30 | End: 2025-05-02 | Stop reason: HOSPADM

## 2025-04-30 RX ORDER — CALCIUM CARBONATE 500 MG/1
500 TABLET, CHEWABLE ORAL 3 TIMES DAILY PRN
Status: DISCONTINUED | OUTPATIENT
Start: 2025-04-30 | End: 2025-05-02 | Stop reason: HOSPADM

## 2025-04-30 RX ORDER — CARBOXYMETHYLCELLULOSE SODIUM 5 MG/ML
1 SOLUTION/ DROPS OPHTHALMIC 3 TIMES DAILY PRN
Status: DISCONTINUED | OUTPATIENT
Start: 2025-04-30 | End: 2025-05-02 | Stop reason: HOSPADM

## 2025-04-30 RX ADMIN — DULOXETINE 60 MG: 60 CAPSULE, DELAYED RELEASE ORAL at 08:44

## 2025-04-30 RX ADMIN — TESTOSTERONE 60.75 MG: 20.25 GEL TOPICAL at 08:45

## 2025-04-30 RX ADMIN — PROPRANOLOL HYDROCHLORIDE 10 MG: 10 TABLET ORAL at 21:13

## 2025-04-30 RX ADMIN — BUSPIRONE HYDROCHLORIDE 20 MG: 10 TABLET ORAL at 21:13

## 2025-04-30 RX ADMIN — PANTOPRAZOLE SODIUM 40 MG: 40 TABLET, DELAYED RELEASE ORAL at 16:19

## 2025-04-30 RX ADMIN — BUSPIRONE HYDROCHLORIDE 20 MG: 10 TABLET ORAL at 08:44

## 2025-04-30 RX ADMIN — ACETAMINOPHEN 650 MG: 325 TABLET ORAL at 13:12

## 2025-04-30 RX ADMIN — HYDROXYZINE HYDROCHLORIDE 50 MG: 25 TABLET, FILM COATED ORAL at 23:57

## 2025-04-30 ASSESSMENT — ACTIVITIES OF DAILY LIVING (ADL)
ADLS_ACUITY_SCORE: 44
ORAL_HYGIENE: INDEPENDENT
ADLS_ACUITY_SCORE: 44
ADLS_ACUITY_SCORE: 44
HYGIENE/GROOMING: INDEPENDENT
ADLS_ACUITY_SCORE: 44
ADLS_ACUITY_SCORE: 44
LAUNDRY: UNABLE TO COMPLETE
DRESS: INDEPENDENT
ADLS_ACUITY_SCORE: 44

## 2025-04-30 NOTE — PLAN OF CARE
Shift Summary ([id number])  Mental Health Status  No acute events or safety concern this shift. Went to group and visible in lounge socializing with other peers. Mood is calm with flat affect. No delusion, hallucination or disorganized behavior noted.   Pt denies SI, SIB, AH, VH, anxiety, depression, racing or intrusive thoughts.   Pt took scheduled medication ok with no problem. Tolerated medications well. No side effect reported or observed.  Prn given: None    Physical Health Status   Moves around independently with no difficulties.   Hygiene is appropriate.   Appetite and fluid intake are adequate. Ate both breakfast and lunch.   Reported no problem with bowel and bladder.   Slept 7.0 hours last night per staff's report.   Took prn Tylenol for 7/10 neck pain per request.   Today's Lab orders: None   Sitting /71 & pulse 79  Standing BP 95/ 66 & pulse 103  Prn given: Tylenol. Reported effective on follow up.  Continue to monitor pt's status Q 15 minutes and stabilize the patient's symptoms with the use of medications and therapeutic programming.
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
## 2025-04-30 NOTE — PROGRESS NOTES
Rehab Group     Start time: 1115  End time: 1200  Patient time total: 20 minutes     attended partial group     #4 attended   Group Type: music   Group Topic Covered: coping skills, relaxation , and sensory intervention      Group Session Detail: Rhythm Band -pts playing maracas to the beat of the music      Patient Response/Contribution:  cooperative with task and actively engaged      Patient Detail: Cooperatively engaged in Music Rhythm Band group to organize thoughts and uplift mood.   Upbeat affect, appropriately engaged in session, responding well to the music.  Presents as high-functioning in group.  States is looking forward to afternoon MT session.        Activity Therapy Per 15 min ()    Patient Active Problem List   Diagnosis    EVELYNE (generalized anxiety disorder)    Moderate episode of recurrent major depressive disorder (H)    PTSD (post-traumatic stress disorder)    Gender dysphoria in adult    Major depressive disorder, recurrent episode, moderate (H)    Suicidal ideation

## 2025-04-30 NOTE — PLAN OF CARE
Group Attendance:  attended partial group    Time session began: 1415  Time session ended: 1455  Patient's total time in group: 30    Total # Attendees   3   Group Type psychotherapeutic     Group Topic Covered insight improvement, relationships, and healthy coping skills     Group Session Detail Participants engaged in a structured activity exploring core beliefs. Education on core beliefs was given, then participants chose a core belief of their own that has created barriers to their mental health and wellbeing.      Patient's response to the group topic/interactions:  cooperative with task, organized, supportive of peers, socially appropriate, listened actively, expressed understanding of topic, attentive, and actively engaged     Patient Details: Max identified a core belief that they are always doing something wrong. They shared how this belief has impacted their relationships, they stated they have a difficult time admitting mistakes. They shared that this impacts their relationship with themself because it's an impossible standard to live up to. They were very supportive of peers throughout the group session.          91581 - Group psychotherapy - 1 Session  Patient Active Problem List   Diagnosis    EVELYNE (generalized anxiety disorder)    Moderate episode of recurrent major depressive disorder (H)    PTSD (post-traumatic stress disorder)    Gender dysphoria in adult    Major depressive disorder, recurrent episode, moderate (H)    Suicidal ideation

## 2025-04-30 NOTE — PLAN OF CARE
-Attending Provider: MARIA ESTHER Dumont CNP  -Voicemail Code: [id number]#  -Team Note Due: Tuesday  -Next Steps:    Make IOP referral        Assessment/Intervention/Current Symtoms and Care Coordination:  Chart review and met with team, discussed pt progress, symptomology, and response to treatment.  Discussed the discharge plan and any potential impediments to discharge.    CTC requested assistance from care coordinator in setting up psych appointment 7-37 days from today.     CTC called [facility] and confirmed they accept [facility] access hospital.      Discharge Plan or Goal:  Pending further stabilization, continued compliance with medications, continued activities of daily living, and development of a safe discharge plan.   Considerations:     Barriers to Discharge:  Impact of mental health symptoms on well being   Impact of mental health symptoms on activities of daily living  Need for medication monitoring and medication management     Referral Status:  None at this time     Legal Status:  Voluntary   County:   File Number:   Start and expiration date of commitment:     Contacts:    Tammi Gautam PA-C as PCP - General (Family Medicine)  Rd Borrero as Specialty Care Coordinator (Plastic Surgery)  Marcelle Owens MD as MD (Plastic Surgery)  Mari - Therapist at [facility]  Dr. Weldon - Psychiatrist @ [facility]  [facility]

## 2025-04-30 NOTE — PLAN OF CARE
Goal Outcome Evaluation:  Problem: Anxiety Signs/Symptoms  Goal: Improved Sleep (Anxiety Signs/Symptoms)  Outcome: Progressing  Intervention: Promote Healthy Sleep Hygiene  Sleep Hygiene Promotion:   regular sleep pattern promoted   awakenings minimized   noise level reduced  Patient observed in assigned room most of the night and appeared to be comfortably asleep. No PRN medications requested or administered. No additional issues or concerns reported or observed. Safety checks completed at least every 15 minutes.   Sleep hours  - 7.  Nursing will continue to monitor.

## 2025-04-30 NOTE — PROGRESS NOTES
"  Rehab Group    Start time: 1315  End time: 1415  Patient time total: 60 minutes    attended full group     #3 attended   Group Type: music   Group Topic Covered: coping skills, emotional regulation, and sensory intervention     Group Session Detail:  Live guitar music and reflection/response     Patient Response/Contribution:  cooperative with task, attentive, and actively engaged     Patient Detail:  Pt became tearful, yet appeared to have a safe and successful emotional release while MT student played and sang a Coldplay song \"Green Eyes\" on guitar.  Pt reported after that the gwen about having people support you to \"carry the load\" is what got them, and caused the tears to release.      Over the course of session, pt also played a light maraca with other music and shared insightful comments on songs played, saying \"your strumming sounds like the waves\" when MT student played \"The River\" by Kaden Adrian for group.    Overall, pt showed substantial benefit from group today and was an open and positive, contributing participant.      Activity Therapy Per 15 min ()    Patient Active Problem List   Diagnosis    EVELYNE (generalized anxiety disorder)    Moderate episode of recurrent major depressive disorder (H)    PTSD (post-traumatic stress disorder)    Gender dysphoria in adult    Major depressive disorder, recurrent episode, moderate (H)    Suicidal ideation       "

## 2025-04-30 NOTE — PLAN OF CARE
BEH IP Unit Acuity Rating Score (UARS)  Patient is given one point for every criteria they meet.    CRITERIA SCORING   On a 72 hour hold, court hold, committed, stay of commitment, or revocation. 0    Patient LOS on BEH unit exceeds 20 days. 0  LOS: 1   Patient under guardianship, 55+, otherwise medically complex, or under age 11. 0   Suicide ideation without relief of precipitating factors. 1   Current plan for suicide. 1   Current plan for homicide. 0   Imminent risk or actual attempt to seriously harm another without relief of factors precipitating the attempt. 0   Severe dysfunction in daily living (ex: complete neglect for self care, extreme disruption in vegetative function, extreme deterioration in social interactions). 1   Recent (last 7 days) or current physical aggression in the ED or on unit. 0   Restraints or seclusion episode in past 72 hours. 0   Recent (last 7 days) or current verbal aggression, agitation, yelling, etc., while in the ED or unit. 0   Active psychosis. 0   Need for constant or near constant redirection (from leaving, from others, etc).  0   Intrusive or disruptive behaviors. 1   Patient requires 3 or more hours of individualized nursing care per 8-hour shift (i.e. for ADLs, meds, therapeutic interventions). 0   TOTAL 4

## 2025-04-30 NOTE — PLAN OF CARE
"Individual Therapy Note      Date of Service: April 30, 2025    Patient: Jason goes by \"Max,\" uses he/him and they/them pronouns    Individuals Present: Jason & EUGENIO Mendoza    Session start: 1029  Session end: 1141  Session duration in minutes: 71      Modality Used: Person Centered and Brief Therapy    Goals: Identify treatment goals     Patient Description of current symptoms: \"I was really afraid of the intrusive thoughts.\"      Mental Status Exam:   Attitude: cooperative  Eye Contact: good  Mood: anxious and sad   Affect: mood congruent  Speech: clear, coherent  Psychomotor Behavior: fidgeting  Thought Process:  linear  Associations: no loose associations  Thought Content: passive suicidal ideation present  Insight: good  Judgment: intact  Attention Span and Concentration: intact    Pt progress: First session - identified treatment goals     Treatment Objective(s) Addressed:   The focus of this session was on rapport building, orienting the patient to therapy, and identifying treatment goals     Progress Towards Goals and Assessment of Patient:   Jason shared history of traumatic childhood experiences, family dynamics, and progress that has been made with outpatient psychotherapists. Spoke extensively about these topics, reported a history of cognitive processing therapy that was helpful in re-framing beliefs around mom, stated it may be helpful to do some work in this modality around trauma related to dad. Max endorsed an interest in EMDR in the future, we discussed differences and similarities between EMDR and ART.     Identified intrusive thoughts about harming dad in response to feeling invalidated by dad and dad's family over the weekend as a precipitating event to hospitalization.     Reported a roommate has pointed out that Max tends to dissociate.     Max identified goals of working with individual therapist throughout hospitalization to follow up on food intake, explore food repulsion, and discuss " mood related to medication changes.    Therapeutic Intervention(s):   Provided active listening, unconditional positive regard, and validation. Understand and identify reasons for hospitalization, how to use the hospital to create change and prevent future hospitalizations. Provided positive reinforcement for progress towards goals, gains in knowledge, and application of skills previously taught in outpatient settings.     Safety Plan: Not yet started    Plan/next step: Writer will remain available for individual psychotherapy    78372 - Psychotherapy (with patient) - 60 (53+*) min    Patient Active Problem List   Diagnosis    EVELYNE (generalized anxiety disorder)    Moderate episode of recurrent major depressive disorder (H)    PTSD (post-traumatic stress disorder)    Gender dysphoria in adult    Major depressive disorder, recurrent episode, moderate (H)    Suicidal ideation

## 2025-04-30 NOTE — PLAN OF CARE
"Goal Outcome Evaluation:    Plan of Care Reviewed With: patient        Patient was visible in the milieu. Patient did not attend therapy group. Patient socialized with select peers. Patient watched TV majority of the shift. Patient complained of chronic neck pain rated 4/10. Prn Tylenol was given with some effectiveness per patient. Patient told the writer, I'm ready for my prn's\".  Patient endorses anxiety and depression rated 8/10, and 3-5/10, on a scale of 0-10. Prn Propranolol was given per prn orders. Patient reported some effectiveness. Patient denies medication side effects. Patient also complained of Nausea, prn Zofran was given  at 1638 with relief. Patient ate 75 % dinner. Patient denies suicidal, and homicidal thoughts, self-injurious behaviors. Auditory, and visual hallucinations. Contracts for safety. Patient denies bowel and bladder issues. Patient was med compliant. Vitals were within normal limit (see flowsheet). Patient requested prn hydroxyzine at hs for anxiety / sleep per prn orders.           "

## 2025-05-01 VITALS
RESPIRATION RATE: 16 BRPM | HEART RATE: 74 BPM | DIASTOLIC BLOOD PRESSURE: 78 MMHG | WEIGHT: 140.4 LBS | HEIGHT: 63 IN | OXYGEN SATURATION: 97 % | SYSTOLIC BLOOD PRESSURE: 115 MMHG | BODY MASS INDEX: 24.88 KG/M2 | TEMPERATURE: 97.8 F

## 2025-05-01 PROCEDURE — 250N000013 HC RX MED GY IP 250 OP 250 PS 637

## 2025-05-01 PROCEDURE — 124N000002 HC R&B MH UMMC

## 2025-05-01 PROCEDURE — 97150 GROUP THERAPEUTIC PROCEDURES: CPT | Mod: GO

## 2025-05-01 PROCEDURE — 99232 SBSQ HOSP IP/OBS MODERATE 35: CPT

## 2025-05-01 PROCEDURE — 90853 GROUP PSYCHOTHERAPY: CPT

## 2025-05-01 RX ADMIN — PROPRANOLOL HYDROCHLORIDE 10 MG: 10 TABLET ORAL at 20:40

## 2025-05-01 RX ADMIN — DICLOFENAC SODIUM TOPICAL GEL, 1% 2 G: 10 GEL TOPICAL at 22:41

## 2025-05-01 RX ADMIN — PANTOPRAZOLE SODIUM 40 MG: 40 TABLET, DELAYED RELEASE ORAL at 08:46

## 2025-05-01 RX ADMIN — BUSPIRONE HYDROCHLORIDE 15 MG: 10 TABLET ORAL at 20:40

## 2025-05-01 RX ADMIN — DULOXETINE 60 MG: 60 CAPSULE, DELAYED RELEASE ORAL at 08:46

## 2025-05-01 RX ADMIN — PROPRANOLOL HYDROCHLORIDE 10 MG: 10 TABLET ORAL at 08:48

## 2025-05-01 RX ADMIN — TESTOSTERONE 60.75 MG: 20.25 GEL TOPICAL at 08:46

## 2025-05-01 RX ADMIN — BUSPIRONE HYDROCHLORIDE 20 MG: 10 TABLET ORAL at 08:46

## 2025-05-01 ASSESSMENT — ACTIVITIES OF DAILY LIVING (ADL)
ADLS_ACUITY_SCORE: 44
LAUNDRY: UNABLE TO COMPLETE
ADLS_ACUITY_SCORE: 44
ORAL_HYGIENE: INDEPENDENT
ADLS_ACUITY_SCORE: 44
HYGIENE/GROOMING: INDEPENDENT
ADLS_ACUITY_SCORE: 44
ADLS_ACUITY_SCORE: 44
DRESS: INDEPENDENT
ADLS_ACUITY_SCORE: 44

## 2025-05-01 NOTE — DISCHARGE INSTRUCTIONS
Behavioral Discharge Planning and Instructions    Summary: You were admitted to [facility] on 4/28/2025 due to {Mental Health 1:367199}. You were treated by {N Providers:536824} and provisionally discharged on ***/***/2025 to {S D/C Locations:902319}.    You were dually committed to the [facility] and the [facility] of [facility] on ***/***/***.  You were also court ordered to take the medications the doctor ordered for you. You are being discharged on a Provisional Discharge Agreement which shall remain in effect for the duration of the Commitment.  Your Commitment expires on ***/***/***.      Continue to follow with your Mental Health  - *** (phone: ***).     Main Diagnosis:   ***    Health Care Follow-up:   A referral was made to [facility] program. For immediate support, please call the Admissions and Intake Team any time at [phone].    Psychiatry appointment: Thursday, June 5, 2025 @ 11:40am via Telehealth   Provider: FELIPE Medellin  Address: [facility], [address] [place] [address]  Phone: [phone]  Fax: [phone]  HUC TO FAX AVS to above appointment, please     Appointment Date/Time: ***   Therapist: ***     Address: ***   Phone Number: ***     Fax: ***    Appointment Date/Time: ***   Primary Care Provider: ***     Address: ***   Phone Number: ***     Fax: ***    Appointment Date/Time: ***   Other: ***     Address: ***   Phone Number: ***  Fax: ***     If no appointments scheduled, explain ***    Attend all scheduled appointments with your outpatient providers. Call at least 24 hours in advance if you need to reschedule an appointment to ensure continued access to your outpatient providers.     Major Treatments, Procedures and Findings:  You were provided with: {Major Treatments:098299}    Symptoms to Report: Feeling more aggressive, increased confusion, losing more sleep,  mood getting worse, or thoughts of suicide.    Early warning signs can include: Increased depression or anxiety sleep disturbances increased thoughts or behaviors of suicide or self-harm  increased unusual thinking, such as paranoia or hearing voices.    Safety and Wellness: Take all medicines as directed. Make no changes unless your doctor suggests them. Follow treatment recommendations. Refrain from alcohol and non-prescribed drugs. Ask your support system to help you reduce your access to items that could harm yourself or others. If there is a concern for safety, call 911.    [facility] Patient Navigation Hub: [facility] work to be your point-of-contact for trustworthy and compassionate care from Inpatient services to [facility] Programmatic Care. We will provide resources and communication to help guide you into programmatic care. Ultimately, our goal is to be the one-stop-shop of communication, coordination, and support for your journey to programmatic care. Phone: [phone]    Community Resources:   General Mental Health Resources:   EmPATH - or Emergency Psychiatric Assessment, Treatment, and Healing: new emergency mental health ER that provides a calming environment with expert care so that people in crisis can get help quickly.  [facility] location: [facility] [place], [address]  [facility]: provides immediate evaluation, therapy, and access to psychiatry. Initial assessment is required before medication can be prescribed  [address] [facility] [address] (On the 2nd Floor)   [phone]  Monday-Thursday 8am-5pm and Friday 8am-4pm  [facility] Walk[facility]: provides immediate evaluation, therapy, and access to psychiatry    [address] [place] [address] [phone]  Daily 9am-9pm   National [place] on Mental Illness (SRIRAM) [place]: Connect for help, to  "navigate the mental health system, and for support and for resources. Call: 5-276-SULP-Helps / 1-570.945.1595  Crisis Text Line: The 24/7 emergency service is available if you or someone you know is experiencing a psychiatric or mental health crisis. Text: \"[place]\" to 705841  [place] Warmline: Are you an adult needing support? Talk to a specialist who has firsthand experience living with a mental health condition. Call: 653.911.6669  Text: \"Support\" to [address]  [facility].org/support/[place]-warmline/  National Suicide Prevention Lifeline: The 24/7 lifeline provides support when in distress, has prevention and crisis resources for you or your loved ones, and resources for professionals. Call 6-330-767-TALK (3052)  Peer Support Connection Warmlines: Rohx-va-sghe telephone support that s safe and supportive. Open 5 p.m. to 9 a.m. Call or text: 1-170.723.1100   COVID Cares Stress Phone Support Service. Any [place] experiencing stress can call 510-RQCT5XV (767-189-3663) for free telephone support from 9am to 9pm every day. The service is a collaboration with volunteers from the [place] Psychiatric Society, the [place] Psychological Association, the [facility] Psychologists, and [facility]. The free service is also accessible at [employer] where searchers can also find psychiatric and mental health services availability and real-time Substance Use Disorder Treatment program openings.  Adult Rehabilitative Mental Health Services (ARMHS): https://[place].gov/dhs/partners-and-providers/policies-procedures/adult-mental-health/adult-rehabilitative-mental-health-services/armhs-certified-providers/  { RESOURCES Mb:779420}    Outpatient Psychiatry/Therapy Resources:   [facility] - (phone: [phone]) https://www.BTC.sx/care/specialty/mental-behavioral-health/  https://www.BTC.sx/care/find/doctors/psychologists/   Health " [facility] - (phone: 3-002-CVXEWYJX) https://www.[facility].org/treatments/Counseling-Adult  [place] Mental [facility] - (phone: 604.856.1924) https://[facility].MEPS Real-Time/  [facility] - (phone: [phone])  https://[facility].MEPS Real-Time/  [facility] - (phone: 1-114.796.9603) https://www.[employer]/  [facility] - (phone: 717.932.2656) https://www.[facility].MEPS Real-Time/  [facility] - (phone: [phone]) https://W[employer] Tags/  Walk[facility]in [facility] Center - (phone: 924.206.3016) https://walkin.org/    General Medication Instructions:   See your medication sheet(s) for instructions.   Take all medicines as directed.  Make no changes unless your doctor suggests them.   Go to all your doctor visits.  Be sure to have all your required lab tests. This way, your medicines can be refilled on time.  Do not use any drugs not prescribed by your doctor.  Avoid alcohol.    Advance Directives:   Scanned document on file with [employer]? No scanned doc  Is document scanned? No. Copy Requested.  Honoring Choices Your Rights Handout: Informed and given  Was more information offered? Pt declined    The Treatment team has appreciated the opportunity to work with you. If you have any questions or concerns about your recent admission, you can contact the unit which can receive your call 24 hours a day, 7 days a week. They will be able to get in touch with a Provider if needed. The unit number is [phone].

## 2025-05-01 NOTE — PLAN OF CARE
Writer invited Max to process group, and gave them the option of the group content or an individual session as other available folks had declined to attend group. They stated they were groggy and did not want to meet.

## 2025-05-01 NOTE — PROGRESS NOTES
"CLINICAL NUTRITION SERVICES - ASSESSMENT NOTE    RECOMMENDATIONS FOR MDs/PROVIDERS TO ORDER:  None at this time    Registered Dietitian Interventions:  -Ensure HP chocolate with meals  -Write-ins: cheese quesadilla, veggie chicken tenders, mac + cheese  -Greek yogurt @ 10am, veggies + hummus @ 2pm, cheese + crackers @ 8pm    Future/Additional Recommendations:  Monitor PO intake, supplement use, weight trends.      REASON FOR ASSESSMENT  Provider order - Nutrition education - Poor PO intake and wanting more healthy snack options and menu write in options    INFORMATION OBTAINED  Assessed patient in room.    NUTRITION HISTORY  Pt reports ~3 years of poor appetite and lack of motivation to care for self 2/2 increased depression. Pt continues to feel like they are forcing themself to eat. Agreeable to order for Ensure, write-ins and scheduled snacks to encourage adequate intake.     CURRENT NUTRITION ORDERS  Diet:  Vegetarian  Snacks/Supplements:  none     Allergies: NKFA    CURRENT INTAKE/TOLERANCE  Eating and drinking adequately per RN notes.     LABS  Nutrition-relevant labs: Reviewed    MEDICATIONS  Nutrition-relevant medications: Reviewed    ANTHROPOMETRICS  Height: 160 cm (5' 3\")  Admission Weight: 61.2 kg (135 lb) (04/28/25 1347)   Most Recent Weight: 64 kg (141 lb) (04/29/25 0912)  IBW: 52.3 kg   BMI: Body mass index is 24.98 kg/m .   Weight History:   Wt Readings from Last 15 Encounters:   05/01/25 63.7 kg (140 lb 6.4 oz)      04/29/25 64 kg (141 lb)   04/29/25 63.8 kg (140 lb 9.6 oz)      04/28/25 61.2 kg (135 lb)      08/20/24 64.1 kg (141 lb 6.4 oz)      No weight loss noted    Dosing Weight: 64 kg, based on actual wt    ASSESSED NUTRITION NEEDS  Estimated Energy Needs: 1600 - 1920 kcals/day (25 - 30 kcals/kg)  Justification: Maintenance  Estimated Protein Needs: 51 - 64 grams protein/day (0.8 - 1 grams of pro/kg)  Justification: Maintenance  Estimated Fluid Needs: 1 mL/kcal  Justification: " Maintenance    SYSTEM AND PHYSICAL FINDINGS    GI symptoms:  intermittent nausea per pt  Skin/wounds: Reviewed    MALNUTRITION  % Intake: Decreased intake does not meet criteria  % Weight Loss: None noted  Subcutaneous Fat Loss: None observed  Muscle Loss: None observed  Fluid Accumulation/Edema: None noted  Malnutrition Diagnosis: Patient does not meet two of the established criteria necessary for diagnosing malnutrition  Malnutrition Present on Admission: No    NUTRITION DIAGNOSIS  Predicted inadequate nutrient intake (kcal/protein  related to decreased appetite    INTERVENTIONS  See nutrition interventions above    GOALS  Patient to consume % of nutritionally adequate meal trays TID, or the equivalent with supplements/snacks.     MONITORING/EVALUATION  Progress toward goals will be monitored and evaluated per policy.    Di Cardoza MPH, RDN, LD  Behavioral Health Adult & Pediatric Dietitian  BEH Clinical Dietitian Eric, Teams, or Desk: [phone]  Weekend/Holiday Vocmilagro: Weekend Holiday Clinical Dietitian [Multi Site Groups]

## 2025-05-01 NOTE — PLAN OF CARE
Group Attendance:  attended full group    Time session began: 1020  Time session ended: 1110  Patient's total time in group: 50    Total # Attendees   5   Group Type psychotherapeutic     Group Topic Covered insight improvement, goals and motivation, and relaxation and grounding techniques/coping skills     Group Session Detail This group session focused on self-exploration through creative tasks to help individuals understand their thoughts, feelings, behaviors, and motivations to foster greater self-awareness and personal growth.      Patient's response to the group topic/interactions:  positive affect, cooperative with task, organized, supportive of peers, socially appropriate, attentive, and actively engaged     Patient Details: Max was an active participant in the group, they engaged their peers in conversations around hobbies and creative outlets. They completed the project and asked clarifying questions. They offered peers validation and social support. They reported difficulty falling asleep and identified insomnia as a regular barrier to sleep, they endorsed satisfaction with the PRN medication they took last night for this.          51209 - Group psychotherapy - 1 Session  Patient Active Problem List   Diagnosis    EVELYNE (generalized anxiety disorder)    Moderate episode of recurrent major depressive disorder (H)    PTSD (post-traumatic stress disorder)    Gender dysphoria in adult    Major depressive disorder, recurrent episode, moderate (H)    Suicidal ideation

## 2025-05-01 NOTE — PROGRESS NOTES
Rehab Group    Start time: 1315  End time: 1445  Patient time total: 45 minutes    attended partial group     #3 attended   Group Type: occupational therapy and OT Clinic   Group Topic Covered: activity therapy, coping skills, problem solving, and self-esteem       Group Session Detail:  OT Clinic     Patient Response/Contribution:  positive affect, supportive of peers, socially appropriate, safe use of materials/supplies, attentive, and actively engaged       Patient Detail:    Pt actively participated in occupational therapy clinic to facilitate coping skill exploration, creative expression within personally meaningful activities, and clinical observation of social, cognitive, and kinesthetic performance skills. Pt response: Independent to initiate, gather materials, sequence, and adjust to workspace demands as needed. Demonstrated fair focus, planning, and problem solving for selected watercolor task. Able to ask for assistance as needed, and appropriately social with peers and staff.  Pt will continue to be encouraged to attend groups for further asssesssment and to address goals identified on plan of care.       558[address] OT Group (2 or more in attendance)      Patient Active Problem List   Diagnosis    EVELYNE (generalized anxiety disorder)    Moderate episode of recurrent major depressive disorder (H)    PTSD (post-traumatic stress disorder)    Gender dysphoria in adult    Major depressive disorder, recurrent episode, moderate (H)    Suicidal ideation

## 2025-05-01 NOTE — PLAN OF CARE
-Attending Provider: MARIA ESTHER Dumont CNP  -Voicemail Code: [id number]  -Team Note Due: Tuesday  -Next Steps:    Follow up with [facility] regarding IOP referral         Assessment/Intervention/Current Symtoms and Care Coordination:  Chart review and met with team, discussed pt progress, symptomology, and response to treatment.  Discussed the discharge plan and any potential impediments to discharge.    CTC made IOP referral for [facility]'s [place] location. They will follow up with 24 hours.     CTC met with Max to provide update on IOP referral.      Discharge Plan or Goal:  Pending further stabilization, continued compliance with medications, continued activities of daily living, and development of a safe discharge plan.   Considerations:     Barriers to Discharge:  Impact of mental health symptoms on well being   Impact of mental health symptoms on activities of daily living  Need for medication monitoring and medication management     Referral Status:  None at this time     Legal Status:  Voluntary   County:   File Number:   Start and expiration date of commitment:     Contacts:    Tammi Gautam PA-C as PCP - General (Family Medicine)  Rd Borrero as Specialty Care Coordinator (Plastic Surgery)  Marcelle Owens MD as MD (Plastic Surgery)  Mari - Therapist at [facility]  Dr. Weldon - Psychiatrist @ [facility]

## 2025-05-01 NOTE — PLAN OF CARE
"Shift Summary (4889-0127)  Mental Health Status   Pt is alert and oriented x 4. Able to communicate needs. No acute events or safety concern this shift. Mood is calm with flat affect. Cooperative and polite. No delusion, hallucination, manic or psychotic symptoms noted. Appears to be in touch with reality. Insight and judgement are appropriate.   Pt denies SI, SIB, AH, VH, anxiety, depression, racing or intrusive thoughts and reported feeling \" good \".   Pt was visible in lounge, social with peers and staff. Participated in therapeutic group activity.    Pt took scheduled medication ok with no problem. Tolerated medications well. No side effect reported or observed.  Prn given: None   Physical Health Status   Moves around independently with no difficulties.   Hygiene is appropriate.   Appetite and fluid intake are adequate. Ate both breakfast and lunch.   Reported no problem with bowel and bladder.   Slept 5.75 hours last night per staff's report.   No c/o pain or discomfort.   Today's Lab orders: None   Sitting /64 & pulse 99  Standing /79 & pulse 92  Prn given: None   Continue to monitor pt's status Q 15 minutes and stabilize the patient's symptoms with the use of medications and therapeutic programming.                   "
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
## 2025-05-01 NOTE — PLAN OF CARE
"Goal Outcome Evaluation:    Plan of Care Reviewed With: patient    Patient was isolative and withdrawn to his room at the beginning of the shift. Patient was visible in the milieu after quiet hour. Patient did not attend therapy group. Patient was observed watching TV with select peers. Patient presented with a bright affect. Patient complained of neck pain rated 2/10. Patient said that pain was manageable. Patient took shower. Patient endorses anxiety rated 7/10, declined prn but utilized distraction and shower. Patient told the writer, \"I will ask for prn if I need it\". Patient rated  depression low, denies suicidal, homicidal, self-injurious behaviors, auditory, and visual hallucinations. Contracts for safety. Patient denies bowel and bladder issues. Vitals were within normal limit (see flowsheet).                " 8/4/21 INR 1.3 LM for patient to return call

## 2025-05-01 NOTE — PLAN OF CARE
Care Coordinator scheduled the following appointment:     Psychiatry appointment: Thursday, June 5, 2025 @ 11:40am via Telehealth   Provider: FELIPE Medellin  Address: [facility], [address] [place], [place] [address]  Phone: [phone]  Fax: [phone]  HUC TO FAX AVS to above appointment, please     CC updated CTC and AVS

## 2025-05-01 NOTE — PLAN OF CARE
BEH IP Unit Acuity Rating Score (UARS)  Patient is given one point for every criteria they meet.    CRITERIA SCORING   On a 72 hour hold, court hold, committed, stay of commitment, or revocation. 0    Patient LOS on BEH unit exceeds 20 days. 0  LOS: 2   Patient under guardianship, 55+, otherwise medically complex, or under age 11. 0   Suicide ideation without relief of precipitating factors. 1   Current plan for suicide. 0   Current plan for homicide. 0   Imminent risk or actual attempt to seriously harm another without relief of factors precipitating the attempt. 0   Severe dysfunction in daily living (ex: complete neglect for self care, extreme disruption in vegetative function, extreme deterioration in social interactions). 1   Recent (last 7 days) or current physical aggression in the ED or on unit. 0   Restraints or seclusion episode in past 72 hours. 0   Recent (last 7 days) or current verbal aggression, agitation, yelling, etc., while in the ED or unit. 0   Active psychosis. 0   Need for constant or near constant redirection (from leaving, from others, etc).  0   Intrusive or disruptive behaviors. 1   Patient requires 3 or more hours of individualized nursing care per 8-hour shift (i.e. for ADLs, meds, therapeutic interventions). 0   TOTAL 4

## 2025-05-01 NOTE — PLAN OF CARE
Max was awake at the start of shift. Received PRN  Atarax upon request for anxiety /sleep at  2357 and was helpful  Noted to have slept for  5.75 hours with even and unlabored respirations  Denies pain and discomfort   No harm to self and others noted or reported this shift.   No SI/HI, delusions, or hallucination noted or reported either.       Problem: Sleep Disturbance  Goal: Adequate Sleep/Rest  Outcome: Progressing     Problem: Adult Behavioral Health Plan of Care  Goal: Plan of Care Review  Outcome: Progressing  Goal: Adheres to Safety Considerations for Self and Others  Intervention: Develop and Maintain Individualized Safety Plan  Recent Flowsheet Documentation  Taken 5/1/2025 0432 by Keyona Braden, RN  Safety Measures: safety rounds completed   Goal Outcome Evaluation:

## 2025-05-02 VITALS
WEIGHT: 140.4 LBS | BODY MASS INDEX: 24.88 KG/M2 | SYSTOLIC BLOOD PRESSURE: 115 MMHG | HEIGHT: 63 IN | RESPIRATION RATE: 16 BRPM | TEMPERATURE: 97.8 F | OXYGEN SATURATION: 98 % | DIASTOLIC BLOOD PRESSURE: 78 MMHG | HEART RATE: 74 BPM

## 2025-05-02 PROCEDURE — 250N000013 HC RX MED GY IP 250 OP 250 PS 637

## 2025-05-02 PROCEDURE — 97150 GROUP THERAPEUTIC PROCEDURES: CPT | Mod: GO

## 2025-05-02 PROCEDURE — 99239 HOSP IP/OBS DSCHRG MGMT >30: CPT

## 2025-05-02 PROCEDURE — 250N000013 HC RX MED GY IP 250 OP 250 PS 637: Performed by: STUDENT IN AN ORGANIZED HEALTH CARE EDUCATION/TRAINING PROGRAM

## 2025-05-02 RX ORDER — PROPRANOLOL HYDROCHLORIDE 10 MG/1
10 TABLET ORAL 2 TIMES DAILY
Qty: 33 TABLET | Refills: 1 | Status: SHIPPED | OUTPATIENT
Start: 2025-05-02

## 2025-05-02 RX ORDER — HYDROXYZINE HYDROCHLORIDE 25 MG/1
25-50 TABLET, FILM COATED ORAL EVERY 4 HOURS PRN
Qty: 45 TABLET | Refills: 1 | Status: SHIPPED | OUTPATIENT
Start: 2025-05-02

## 2025-05-02 RX ORDER — ONDANSETRON 4 MG/1
4 TABLET, ORALLY DISINTEGRATING ORAL EVERY 8 HOURS PRN
Qty: 15 TABLET | Refills: 1 | Status: SHIPPED | OUTPATIENT
Start: 2025-05-02

## 2025-05-02 RX ORDER — PANTOPRAZOLE SODIUM 40 MG/1
40 TABLET, DELAYED RELEASE ORAL
Qty: 16 TABLET | Refills: 1 | Status: SHIPPED | OUTPATIENT
Start: 2025-05-03

## 2025-05-02 RX ORDER — SUMATRIPTAN 50 MG/1
50 TABLET, FILM COATED ORAL
Qty: 15 TABLET | Refills: 1 | Status: SHIPPED | OUTPATIENT
Start: 2025-05-02

## 2025-05-02 RX ORDER — BUSPIRONE HYDROCHLORIDE 10 MG/1
20 TABLET ORAL 2 TIMES DAILY
Status: DISCONTINUED | OUTPATIENT
Start: 2025-05-02 | End: 2025-05-02 | Stop reason: HOSPADM

## 2025-05-02 RX ORDER — TESTOSTERONE 20.25 MG/1.25G
60.75 GEL TOPICAL DAILY
Qty: 1.25 G | Refills: 1 | Status: SHIPPED | OUTPATIENT
Start: 2025-05-03

## 2025-05-02 RX ADMIN — TESTOSTERONE 60.75 MG: 20.25 GEL TOPICAL at 09:05

## 2025-05-02 RX ADMIN — PANTOPRAZOLE SODIUM 40 MG: 40 TABLET, DELAYED RELEASE ORAL at 09:05

## 2025-05-02 RX ADMIN — BUSPIRONE HYDROCHLORIDE 15 MG: 10 TABLET ORAL at 09:04

## 2025-05-02 RX ADMIN — PROPRANOLOL HYDROCHLORIDE 10 MG: 10 TABLET ORAL at 09:06

## 2025-05-02 RX ADMIN — OLANZAPINE 10 MG: 10 TABLET, FILM COATED ORAL at 09:46

## 2025-05-02 RX ADMIN — DULOXETINE 60 MG: 60 CAPSULE, DELAYED RELEASE ORAL at 09:05

## 2025-05-02 ASSESSMENT — ACTIVITIES OF DAILY LIVING (ADL)
ADLS_ACUITY_SCORE: 44

## 2025-05-02 NOTE — CARE PLAN
05/02/25 0407   Adult Attendant Initiation Algorithm   Harm Category none   No Harm Category Noted at This Time patient sleeping, no interaction   Types of Special Monitoring milieu management

## 2025-05-02 NOTE — PROGRESS NOTES
Rehab Group    Start time: 1015  End time: 1145  Patient time total: 90 minutes    attended full group    #5 attended   Group Type: occupational therapy and OT Clinic   Group Topic Covered: activity therapy, coping skills, and problem solving       Group Session Detail:  OT Clinic     Patient Response/Contribution:  positive affect, cooperative with task, organized, supportive of peers, socially appropriate, safe use of materials/supplies, listened actively, attentive, and actively engaged       Patient Detail:    Pt actively participated in occupational therapy clinic to facilitate coping skill exploration, creative expression within personally meaningful activities, and clinical observation of social, cognitive, and kinesthetic performance skills. Pt response: Independent to initiate, gather materials, sequence, and adjust to workspace demands as needed. Demonstrated good focus, planning, and problem solving for selected garcia art task. Able to ask for assistance as needed, and approprietly social with peers and staff.  Pt reports she will be discharging today and is looking forward to it.      [id number] OT Group (2 or more in attendance)      Patient Active Problem List   Diagnosis    EVELYNE (generalized anxiety disorder)    Moderate episode of recurrent major depressive disorder (H)    PTSD (post-traumatic stress disorder)    Gender dysphoria in adult    Major depressive disorder, recurrent episode, moderate (H)    Suicidal ideation

## 2025-05-02 NOTE — PLAN OF CARE
BEH IP Unit Acuity Rating Score (UARS)  Patient is given one point for every criteria they meet.    CRITERIA SCORING   On a 72 hour hold, court hold, committed, stay of commitment, or revocation. 0    Patient LOS on BEH unit exceeds 20 days. 0  LOS: 3   Patient under guardianship, 55+, otherwise medically complex, or under age 11. 0   Suicide ideation without relief of precipitating factors. 1   Current plan for suicide. 0   Current plan for homicide. 0   Imminent risk or actual attempt to seriously harm another without relief of factors precipitating the attempt. 0   Severe dysfunction in daily living (ex: complete neglect for self care, extreme disruption in vegetative function, extreme deterioration in social interactions). 1   Recent (last 7 days) or current physical aggression in the ED or on unit. 0   Restraints or seclusion episode in past 72 hours. 0   Recent (last 7 days) or current verbal aggression, agitation, yelling, etc., while in the ED or unit. 0   Active psychosis. 0   Need for constant or near constant redirection (from leaving, from others, etc).  0   Intrusive or disruptive behaviors. 1   Patient requires 3 or more hours of individualized nursing care per 8-hour shift (i.e. for ADLs, meds, therapeutic interventions). 0   TOTAL 4

## 2025-05-02 NOTE — DISCHARGE SUMMARY
PSYCHIATRY  DISCHARGE SUMMARY     DATE OF DISCHARGE   05/02/2025           DISCHARGE DIAGNOSIS   PTSD  Suicidal ideation, chronic  EVELYNE  R/O social anxiety disorder  MDD, severe, recurrent  Gender dysphoria  Cannabis use      Clinically Significant Risk Factors                                    # Financial/Environmental Concerns: unemployed               Patient Active Problem List   Diagnosis    EVELYNE (generalized anxiety disorder)    Moderate episode of recurrent major depressive disorder (H)    PTSD (post-traumatic stress disorder)    Gender dysphoria in adult    Major depressive disorder, recurrent episode, moderate (H)    Suicidal ideation          REASON FOR ADMISSION   This is a 34 year old transgender male with history of Severe episode of recurrent major depression without psychotic features, Generalized anxiety disorder, Panic attacks, Delayed sleep phase, PTSD, Gender dysphoria, cannabis use disorder.  Patient presented to [facility] on 4/28/2025 for a mental health evaluation.  Patient reported experiencing mental health symptoms for years however has never admitted to an inpatient psychiatric unit.  Patient endorsed increasing psychosocial stressors in the community.  Patient also reported a history of self-injurious behavior and that they are experiencing thoughts to strangle themselves.  Urine drug screening completed in the ED and positive for cannabis; negative for all other substances.  Patient reported experiencing chronic neck pain and lower back pain in the ED.  Patient did not endorse any other acute medical concerns. Patient was evaluated by provider in the ED and determined to be medically stable.  Patient subsequently admitted voluntarily to inpatient psychiatric unit 10N with attending Dr. Golden for further psychiatric stabilization.  Upon admission, patient placed on status 15 monitoring.  Patient also placed on precautions: Self-injury precautions, suicide precautions.        HOSPITAL  COURSE   Admitted due to aforementioned presentation.  Education regarding diagnostic and treatment options with risks, benefits and alternatives and adequate verbalization of understanding.  Discussed reviewed in further detail, stressors and events leading to presentation.    During current hospitalization direct care was provided by MARIA ESTHER Del Toro CNP.    During hospitalization patient was continued on buspirone 20 mg twice daily to target anxiety symptoms however patient reported this medication to only be partially effective for anxiety symptoms and that their preference is to consider tapering off the medication with outpatient provider.  Patient was continued on duloxetine 60 mg p.o. daily to target mood disorder symptoms; this was continued with no dose changes as patient reported a recent dose increase by outpatient psychiatry provider.  Patient's mood disorder symptoms subsequently stabilized.  Patient trialed PRN propranolol for anxiety symptom management.  Patient reported this medication to be significantly helpful for anxiety symptom management and subsequently propranolol was scheduled twice daily with blood pressure parameters in place.  Patient tolerated this well and anxiety symptoms subsequently stabilized.  Patient utilized as needed hydroxyzine for anxiety symptom management.  Patient expressed interest in an IOP program for after discharge and unit CTC facilitated aftercare plan.  See behavioral discharge plan below for details.    Labs:      Patient declined hCG testing during hospitalization.      Latest Reference Range & Units 04/28/25 14:48 04/29/25 08:48   Sodium 135 - 145 mmol/L  145   Potassium 3.4 - 5.3 mmol/L  4.1   Chloride 98 - 107 mmol/L  106   Carbon Dioxide (CO2) 22 - 29 mmol/L  26   Urea Nitrogen 6.0 - 20.0 mg/dL  9.9   Creatinine 0.51 - 0.95 mg/dL  0.86   GFR Estimate >60 mL/min/1.73m2  90   Calcium 8.8 - 10.4 mg/dL  9.0   Anion Gap 7 - 15 mmol/L  13   Cholesterol <200  "mg/dL  149   Glucose 70 - 99 mg/dL  104 (H)   HDL Cholesterol >=50 mg/dL  45 (L)   Estimated Average Glucose <117 mg/dL  91   Hemoglobin A1C <5.7 %  4.8   LDL Cholesterol Calculated <100 mg/dL  95   Non HDL Cholesterol <130 mg/dL  104   Triglycerides <150 mg/dL  46   Vitamin B12 232 - 1,245 pg/mL  350   Vitamin D, Total (25-Hydroxy) 20 - 50 ng/mL  35   WBC 4.0 - 11.0 10e3/uL  5.5   Hemoglobin 11.7 - 15.7 g/dL  14.3   Hematocrit 35.0 - 47.0 %  41.6   Platelet Count 150 - 450 10e3/uL  248   RBC Count 3.80 - 5.20 10e6/uL  4.61   MCV 78 - 100 fL  90   MCH 26.5 - 33.0 pg  31.0   MCHC 31.5 - 36.5 g/dL  34.4   RDW 10.0 - 15.0 %  12.2   Amphetamine Qual Urine Screen Negative  Screen Negative    Fentanyl Qual Urine Screen Negative  Screen Negative    Cocaine Urine Screen Negative  Screen Negative    Benzodiazepine Urine Screen Negative  Screen Negative    Opiates Qualitative Urine Screen Negative  Screen Negative    PCP Urine Screen Negative  Screen Negative    Cannabinoids Qual Urine Screen Negative  Screen Positive !    Barbiturates Qual Urine Screen Negative  Screen Negative    (H): Data is abnormally high  (L): Data is abnormally low  !: Data is abnormal    On day of discharge, provider met with patient in hospital room of unit 10N. Upon patient interview, patient reports their mood as, \" better.\"  Patient's affect appears normal range.  Patient increase in anxiety symptoms today which may be triggered by discharge from the hospital..  Patient denies any depression symptoms. Patient denies any AH or VH. Patient denies any paranoid thoughts or delusions. Patient endorses some difficulty sleeping overnight due to the hospital environment. Patient reports appetite is slightly decreased with intermittent nausea which they report has been ongoing since prior to admission the hospital.  Patient has been documented to be eating and as well as drinking fluids adequately.  Patient was started on Protonix during hospitalization " for GERD symptoms.  Patient reports that he plan to follow up with her outpatient PCP within 30 days for further management of GERD symptoms as well as if nausea persists.  Patient denied any episodes of emesis during hospitalization.  Patient denies any issues with bowel or bladder.  Patient vital signs reviewed and noted to be stable.  Patient does report ongoing chronic neck and back pain and that they feel Voltaren gel has been notably helpful for management of this; due to this, Voltaren gel was continued at time of discharge.  Patient denies any acute medical concerns today. Patient has no other questions or concerns. Today, plan will be to discharge to home with referral for mental health IOP program.    Patient denies SI, SIB, SA, plan or intent. Denies HI. Patient is able to contract for safety. Patient does have notable risk factors for self-harm, including single status, substance abuse, and previous suicide attempts. However, risk is mitigated by ability to volunteer a safety plan and history of seeking help when needed. Therefore, based on all available evidence including the factors cited above, patient does not appear to be at imminent risk for self-harm, does not meet criteria for a 72-hr hold, and therefore remains appropriate for ongoing outpatient level of care. Voluntary referral for mental health IOP program was offered, they accepted this offer. Provider discussed with patient that if they develop thoughts of SI, SIB, SA, HI, plan or intent, or any other medical emergency, to go to the nearest ED or call 911. Patient verbalizes understanding of this. Patient was also provided with crisis numbers and mental health resources in the community at time of discharge.        Behavioral discharge plan and instructions were reviewed with patient and a copy was provided to them at time of discharge:    Behavioral Discharge Planning and Instructions     Summary: You were admitted to [facility] on  4/28/2025 due to Depression, Anxiety, and Suicidal Ideations. You were treated by MARIA ESTHER Dumont CNP and discharged on 05/02/2025 to Home.     Main Diagnosis:   DIAGNOSIS   Principal Problem:    PTSD  Suicidal ideation, chronic  EVELYNE      Health Care Follow-up:   A referral was made to [facility] [place] IOP program. For immediate support, please call the Admissions and Intake Team any time at [phone].     Psychiatry appointment: Thursday, June 5, 2025 @ 11:40am via Telehealth   Provider: FELIPE Medellin  Address: [facility], [address] [place] [address]  Phone: [phone]  Fax: [phone]  HUC TO FAX AVS to above appointment, please      Please follow up with outpatient PCP within 30 days for further management of GERD. Patient preference is to schedule this appointment independently.     Attend all scheduled appointments with your outpatient providers. Call at least 24 hours in advance if you need to reschedule an appointment to ensure continued access to your outpatient providers.      Major Treatments, Procedures and Findings:  You were provided with: a psychiatric assessment, assessed for medical stability, medication evaluation and/or management, group therapy, individual therapy, and milieu management     Symptoms to Report: Feeling more aggressive, increased confusion, losing more sleep, mood getting worse, or thoughts of suicide.     Early warning signs can include: Increased depression or anxiety sleep disturbances increased thoughts or behaviors of suicide or self-harm  increased unusual thinking, such as paranoia or hearing voices.     Safety and Wellness: Take all medicines as directed. Make no changes unless your doctor suggests them. Follow treatment recommendations. Refrain from alcohol and non-prescribed drugs. Ask your support system to help you reduce your access to items that could harm yourself or others. If there is a  "concern for safety, call 911.     [place] Patient Navigation Hub: [facility] Navigators work to be your point-of-contact for trustworthy and compassionate care from Inpatient services to [facility] Programmatic Care. We will provide resources and communication to help guide you into programmatic care. Ultimately, our goal is to be the one-stop-shop of communication, coordination, and support for your journey to programmatic care. Phone: [phone]     Community Resources:   General Mental Health Resources:   EmPATH - or Emergency Psychiatric Assessment, Treatment, and Healing: new emergency mental health ER that provides a calming environment with expert care so that people in crisis can get help quickly.  [facility] location: [facility] [place], [place], [address]  [facility]: provides immediate evaluation, therapy, and access to psychiatry. Initial assessment is required before medication can be prescribed  [address] [place] [id number] (On the 2nd Floor)   [phone]  Monday-Thursday 8am-5pm and Friday 8am-4pm  [facility]: provides immediate evaluation, therapy, and access to psychiatry    [address] [place] [address] [phone]  Daily 9am-9pm   National Advance on Mental Illness (SRIRAM) [place]: Connect for help, to navigate the mental health system, and for support and for resources. Call: 2-367-QOFT-Helps / 0-502-304-4042  Crisis Text Line: The 24/7 emergency service is available if you or someone you know is experiencing a psychiatric or mental health crisis. Text: \"[place]\" to 516347  [place] Warmline: Are you an adult needing support? Talk to a specialist who has firsthand experience living with a mental health condition. Call: 992.786.9969  Text: \"Support\" to [address]  mentalhealthmn.org/support/[place]-warmline/  National Suicide Prevention Lifeline: The 24/7 " lifeline provides support when in distress, has prevention and crisis resources for you or your loved ones, and resources for professionals. Call 6-281-267-PKUP (8275)  Peer Support Connection Warmlines: Gfsv-wt-ykdw telephone support that s safe and supportive. Open 5 p.m. to 9 a.m. Call or text: 1-133.747.3693   COVID Cares Stress Phone Support Service. Any [place] experiencing stress can call 678-JWFT3FV (748-326-1300) for free telephone support from 9am to 9pm every day. The service is a collaboration with volunteers from the [place] Psychiatric Society, the [place] Psychological Association, the [facility] Psych[facility], and Mental Health [place]. The free service is also accessible at Virtual Web where searchers can also find psychiatric and mental health services availability and real-time Substance Use Disorder Treatment program openings.  Adult Rehabilitative Mental Health Services (ARMHS): https://[place].gov/dhs/partners-and-providers/policies-procedures/adult-mental-health/adult-rehabilitative-mental-health-services/armhs-certified-providers/  [facility] Crisis Response [phone]  [facility] Crisis (COPE) Response - Adult [phone]  [facility] Crisis Response - Adult [phone]  [facility] Rapid Response [phone]     Outpatient Psychiatry/Therapy Resources:   [facility] - (phone: 351.699.3329) https://www.[employer].LAFASO/care/specialty/mental-behavioral-health/  https://www.[employer]/care/find/doctors/psychologists/  [facility] - (phone: 6-881-DJNSAMNF) https://www.[facility].org/treatments/Counseling-Adult  [facility] - (phone: [phone]) https://[facility] HealthSojeans.LAFASO/  [facility] - (phone: [phone])  https://[facility]/  [facility] - (phone: 1-195.687.5586) https://www.[employer]wi[employer].LAFASO/  [facility] -  "(phone: 951.873.4707) https://www.[employer]urly/  [facility] - (phone: [phone]) https://[employer]  Walk[facility] - (phone: 286.433.2874) https://walkin.org/     General Medication Instructions:   See your medication sheet(s) for instructions.   Take all medicines as directed.  Make no changes unless your doctor suggests them.   Go to all your doctor visits.  Be sure to have all your required lab tests. This way, your medicines can be refilled on time.  Do not use any drugs not prescribed by your doctor.  Avoid alcohol.     Advance Directives:   Scanned document on file with [employer]? No scanned doc  Is document scanned? No. Copy Requested.  Honoring Choices Your Rights Handout: Informed and given  Was more information offered? Pt declined     The Treatment team has appreciated the opportunity to work with you. If you have any questions or concerns about your recent admission, you can contact the unit which can receive your call 24 hours a day, 7 days a week. They will be able to get in touch with a Provider if needed. The unit number is [phone].       MENTAL STATUS EXAM   Vitals: /78 (BP Location: Left arm, Patient Position: Sitting, Cuff Size: Adult Regular)   Pulse 74   Temp 97.8  F (36.6  C) (Temporal)   Resp 16   Ht 1.6 m (5' 3\")   Wt 63.7 kg (140 lb 6.4 oz)   SpO2 98%   BMI 24.87 kg/m      Mental Status:  Appearance:  Casually groomed  Mood: Reports, \"better.\"  Affect: full range  was congruent to speech  Suicidal Ideation: PRESENT / ABSENT: absent   Homicidal Ideation: PRESENT / ABSENT: absent   Thought process: Logical, goal oriented  Thought content: denies suicidal and violent ideation.   Fund of Knowledge: Average  Attention/Concentration: Fair  Language ability:  Intact  Memory: Appears intact, not formally assessed  Insight:  fair.  Judgement: fair  Orientation: Yes, x4  Psychomotor Behavior: normal or unremarkable    Muscle Strength and " Tone: MuscleStrength: Normal  Gait and Station: Normal       DISCHARGE MEDICATIONS   Discharge Medication Options:   Current Discharge Medication List        START taking these medications    Details   diclofenac (VOLTAREN) 1 % topical gel Apply 2 g topically 3 times daily as needed for moderate pain.  Qty: 50 g, Refills: 2    Associated Diagnoses: Major depressive disorder, recurrent episode, moderate (H)      hydrOXYzine HCl (ATARAX) 25 MG tablet Take 1-2 tablets (25-50 mg) by mouth every 4 hours as needed for anxiety or other (sleep).  Qty: 45 tablet, Refills: 1    Associated Diagnoses: Major depressive disorder, recurrent episode, moderate (H)      pantoprazole (PROTONIX) 40 MG EC tablet Take 1 tablet (40 mg) by mouth every morning (before breakfast).  Qty: 16 tablet, Refills: 1    Associated Diagnoses: Major depressive disorder, recurrent episode, moderate (H)      propranolol (INDERAL) 10 MG tablet Take 1 tablet (10 mg) by mouth 2 times daily.  Qty: 33 tablet, Refills: 1    Associated Diagnoses: Major depressive disorder, recurrent episode, moderate (H)           CONTINUE these medications which have CHANGED    Details   ondansetron (ZOFRAN ODT) 4 MG ODT tab Take 1 tablet (4 mg) by mouth every 8 hours as needed for nausea.  Qty: 15 tablet, Refills: 1    Associated Diagnoses: Major depressive disorder, recurrent episode, moderate (H)      SUMAtriptan (IMITREX) 50 MG tablet Take 1 tablet (50 mg) by mouth at onset of headache for migraine.  Qty: 15 tablet, Refills: 1    Associated Diagnoses: Major depressive disorder, recurrent episode, moderate (H)      testosterone (ANDROGEL) 20.25 MG/1.25GM (1.62%) topical gel Place 3 packets (60.75 mg) onto the skin daily.  Qty: 1.25 g, Refills: 1    Associated Diagnoses: Major depressive disorder, recurrent episode, moderate (H)           CONTINUE these medications which have NOT CHANGED    Details   busPIRone (BUSPAR) 10 MG tablet Take 20 mg by mouth 2 times daily.       DULoxetine (CYMBALTA) 60 MG capsule Take 60 mg by mouth daily.           STOP taking these medications       hydrOXYzine (VISTARIL) 50 MG capsule Comments:   Reason for Stopping:         testosterone (ANDROGEL 1.62 % PUMP) 20.25 MG/ACT gel Comments:   Reason for Stopping:               Medication adherence issues: MS Med Adherence Y/N: No  Medication side effects: MEDICATION SIDE EFFECTS: no side effects reported         DISCHARGE PLAN   1.  Education given regarding diagnostic and treatment options with risks, benefits and alternatives with adequate verbalization of understanding.  2.  Discharge to home with self-care. Upon detailed review of risk factors, patient amenable for release.   3.  Continue aforementioned medications and associated medication changes with follow-up by outpatient mental health provider.  4.  Crisis management planning in place.    5.  Continue efforts for sobriety.  6. Attend follow up appointments: A referral was made to [facility] [place] IOP program. For immediate support, please call the Admissions and Intake Team any time at [phone].     Psychiatry appointment: Thursday, June 5, 2025 @ 11:40am via Telehealth   Provider: FELIPE Medellin  Address: [facility], [address] [place] [address]  Phone: [phone]  Fax: [phone]  HUC TO FAX AVS to above appointment, please      Please follow up with outpatient PCP within 30 days for further management of GERD. Patient preference is to schedule this appointment independently.   .      Nursing and  to review further discharge recommendations.     TOTAL TIME:  Greater than 30 minutes for discharge planning.    This note was created with help of Dragon dictation system. Grammatical / typing errors are not intentional.    America Doran, APRN CNP

## 2025-05-02 NOTE — PLAN OF CARE
Nursing discharge note  Pt alert and oriented x 3. Presents polite and cooperative. Able to communicate needs. Speech is clear and coherent. Mood is calm with flat affect. Poor concentrations. Insight and judgement are fair. Hopeful for future. Visible in milieu and appropriately social with peers and staff. Appetite adequate. Pt is medication compliance. Slept 7.0 hours last night.   Discharged today. All discharge medications and instructions were reviewed with pt. Copy of discharge instruction and unit address/phone number given to pt. Walking, escorted down stairs and transferred to car safely.   At the time of discharge, pt denied any SI, SIB, HI, hallucination, racing thought, suicidal or homicidal ideations. No evidence of psychosis or paranoid/delusional thoughts. Pt denies access to guns. Denies feeling hopeless or helpless. Pt is determined to not be an immediate danger to himself or others.   [facility]  Discharge place: home  Transportation: Taxi   Outcome: Progressing  Pain: Denies   Valuable: Given to pt from security and locker.

## 2025-05-02 NOTE — PLAN OF CARE
Problem: Adult Behavioral Health Plan of Care  Goal: Adheres to Safety Considerations for Self and Others  Intervention: Develop and Maintain Individualized Safety Plan  Recent Flowsheet Documentation  Taken 5/2/2025 0400 by Terry Green RN  Safety Measures: safety rounds completed     Problem: Sleep Disturbance  Goal: Adequate Sleep/Rest  Outcome: Progressing     Problem: Anxiety Signs/Symptoms  Goal: Improved Sleep (Anxiety Signs/Symptoms)  Intervention: Promote Healthy Sleep Hygiene  Recent Flowsheet Documentation  Taken 5/2/2025 0400 by Terry Green RN  Sleep Hygiene Promotion: regular sleep pattern promoted   Goal Outcome Evaluation:       Patient slept all night without any safety concerns. No pain was reported and no medication administered. Breathing appeared normal and no distress was observed.      Sleep Hours: 7

## 2025-05-02 NOTE — PLAN OF CARE
-Attending Provider: MARIA ESTHER Dumont CNP  -Voicemail Code: [id number]  -Team Note Due: Tuesday  -Next Steps:    Discharge today @ 1pm  Meds sent to outside pharmacy  AVS complete   Transportation TBD, if friends cannot  CTC will order cab        Assessment/Intervention/Current Symtoms and Care Coordination:  Chart review and met with team, discussed pt progress, symptomology, and response to treatment.  Discussed the discharge plan and any potential impediments to discharge.    CTC met with Max to talk through the discharge plan. Max confirmed they want to discharge on this day, and that 1pm would be ideal. They confirmed they will call friends to see if someone can pick them up. If not CTC will order a cab. PAPO and Max called [facility] to follow up on referral. Max was informed that the referral was still under review and someone will follow up next week regarding next steps.      Discharge Plan or Goal:  Pending further stabilization, continued compliance with medications, continued activities of daily living, and development of a safe discharge plan.   Considerations:     Barriers to Discharge:  Impact of mental health symptoms on well being   Impact of mental health symptoms on activities of daily living  Need for medication monitoring and medication management     Referral Status:  [facility] IOP referral made on 5/1    Legal Status:  Voluntary   County:   File Number:   Start and expiration date of commitment:     Contacts:    Tammi Gautam PA-C as PCP - General (Family Medicine)  Rd Borrero as Specialty Care Coordinator (Plastic Surgery)  Marcelle Owens MD as MD (Plastic Surgery)  Mari - Therapist at [facility]  Dr. Weldon - Psychiatrist @ [facility]

## 2025-05-02 NOTE — PLAN OF CARE
Goal Outcome Evaluation:    Plan of Care Reviewed With: patient        Patient was observed putting together puzzles with select peers at the beginning of the shift. Patient spent most of her  evening time watching TV. Patient socialized minimally with peers. Patient complained of chronic neck pain rated 4/10. Patient utilized prn Voltaren gel once with some effctiveness. Patient denies anxiety, depression, suicidal, homicidal, auditory, and visual hallucinations. Contracts for safety. Patient ate 100% dinner and snacks. Patient was med compliant. Vitals were within normal limit.

## 2025-05-17 ENCOUNTER — HEALTH MAINTENANCE LETTER (OUTPATIENT)
Age: 35
End: 2025-05-17